# Patient Record
Sex: MALE | Race: WHITE | NOT HISPANIC OR LATINO
[De-identification: names, ages, dates, MRNs, and addresses within clinical notes are randomized per-mention and may not be internally consistent; named-entity substitution may affect disease eponyms.]

---

## 2022-02-03 PROBLEM — Z00.00 ENCOUNTER FOR PREVENTIVE HEALTH EXAMINATION: Status: ACTIVE | Noted: 2022-02-03

## 2022-02-08 ENCOUNTER — TRANSCRIPTION ENCOUNTER (OUTPATIENT)
Age: 47
End: 2022-02-08

## 2022-02-09 ENCOUNTER — OUTPATIENT (OUTPATIENT)
Dept: OUTPATIENT SERVICES | Facility: HOSPITAL | Age: 47
LOS: 1 days | End: 2022-02-09
Payer: COMMERCIAL

## 2022-02-09 ENCOUNTER — RESULT REVIEW (OUTPATIENT)
Age: 47
End: 2022-02-09

## 2022-02-09 DIAGNOSIS — K62.5 HEMORRHAGE OF ANUS AND RECTUM: ICD-10-CM

## 2022-02-09 PROCEDURE — 88305 TISSUE EXAM BY PATHOLOGIST: CPT

## 2022-02-09 PROCEDURE — 45385 COLONOSCOPY W/LESION REMOVAL: CPT

## 2022-02-09 PROCEDURE — 88305 TISSUE EXAM BY PATHOLOGIST: CPT | Mod: 26

## 2022-02-09 PROCEDURE — C1889: CPT

## 2022-02-09 PROCEDURE — 45380 COLONOSCOPY AND BIOPSY: CPT | Mod: XS

## 2022-02-09 DEVICE — CLIP RESOLUTION 360 235CM: Type: IMPLANTABLE DEVICE | Status: FUNCTIONAL

## 2022-02-09 DEVICE — HEMOSPRAY HEMOSTAT ENDO 7F: Type: IMPLANTABLE DEVICE | Status: FUNCTIONAL

## 2022-02-10 LAB — SURGICAL PATHOLOGY STUDY: SIGNIFICANT CHANGE UP

## 2022-07-14 ENCOUNTER — APPOINTMENT (OUTPATIENT)
Dept: ORTHOPEDIC SURGERY | Facility: CLINIC | Age: 47
End: 2022-07-14

## 2022-07-14 VITALS — WEIGHT: 200 LBS | BODY MASS INDEX: 31.39 KG/M2 | HEIGHT: 67 IN

## 2022-07-14 DIAGNOSIS — Z72.89 OTHER PROBLEMS RELATED TO LIFESTYLE: ICD-10-CM

## 2022-07-14 DIAGNOSIS — Z78.9 OTHER SPECIFIED HEALTH STATUS: ICD-10-CM

## 2022-07-14 PROCEDURE — 73140 X-RAY EXAM OF FINGER(S): CPT | Mod: RT

## 2022-07-14 PROCEDURE — 99072 ADDL SUPL MATRL&STAF TM PHE: CPT

## 2022-07-14 PROCEDURE — 99203 OFFICE O/P NEW LOW 30 MIN: CPT

## 2022-07-14 NOTE — HISTORY OF PRESENT ILLNESS
[Work related] : work related [Sudden] : sudden [7] : 7 [Dull/Aching] : dull/aching [Sharp] : sharp [Constant] : constant [Household chores] : household chores [Leisure] : leisure [Work] : work [Nothing helps with pain getting better] : Nothing helps with pain getting better [Full time] : Work status: full time [de-identified] : 46 Year old male presents with RIGHt hand and middle finger pain. Was mild in nature for the past few weeks but then got worse a few days ago after a long day of manual work. Pain with ROM of the middle finger. Denies clicking and locking.  [] : no [FreeTextEntry3] : 7/11/2022 [FreeTextEntry5] : USING POWER TOOLS AND FELT SORENESS THROUGHOUT THE DAY  [de-identified] : NONE  [de-identified] : ANUP

## 2022-07-14 NOTE — PHYSICAL EXAM
[3rd] : 3rd [MCP Joint] : MCP joint [Finger Flexion] : finger flexion [Right] : right fingers [FreeTextEntry9] : RIGHT middle MP degenerative changes

## 2022-07-14 NOTE — DISCUSSION/SUMMARY
[de-identified] : Discussed that his arthritis is not new at this time, but that his pain is likely and exacerbation/flare up due to his heavy use.  He [id number] like to start with NSAIds and warm compresses

## 2022-07-14 NOTE — WORK
[Sprain/Strain] : sprain/strain [Other: ___] : [unfilled] [Was the competent medical cause of the injury] : was the competent medical cause of the injury [Are consistent with the injury] : are consistent with the injury [Consistent with my objective findings] : consistent with my objective findings [Partial] : partial [Reveals pre-existing condition(s) that may affect treatment/prognosis] : reveals pre-existing condition(s) that may affect treatment/prognosis [I provided the services listed above] :  I provided the services listed above. [FreeTextEntry2] : Arthritis in MP joint

## 2022-08-04 ENCOUNTER — APPOINTMENT (OUTPATIENT)
Dept: ORTHOPEDIC SURGERY | Facility: CLINIC | Age: 47
End: 2022-08-04

## 2022-08-04 VITALS — BODY MASS INDEX: 31.39 KG/M2 | WEIGHT: 200 LBS | HEIGHT: 67 IN

## 2022-08-04 PROCEDURE — 20605 DRAIN/INJ JOINT/BURSA W/O US: CPT

## 2022-08-04 PROCEDURE — 99214 OFFICE O/P EST MOD 30 MIN: CPT | Mod: 25

## 2022-08-04 PROCEDURE — 99072 ADDL SUPL MATRL&STAF TM PHE: CPT

## 2022-08-04 NOTE — HISTORY OF PRESENT ILLNESS
[Work related] : work related [8] : 8 [0] : 0 [Full time] : Work status: full time [de-identified] : 46 year old male followed for RIGHT middle MP arthritis and sprain.  Has been using NSAIDs and warm compresses.  States there may be slight relief.  \par Occup: [employer] [] : no [FreeTextEntry1] : right hand and middle finger  [de-identified] : no treatment at this time

## 2022-08-04 NOTE — PROCEDURE
[Small Joint Injection] : small joint injection [Right] : of the right [3rd] : 3rd [Metacarpalphalangeal joint] : metacarpalphalangeal joint [___ cc    1%] : Lidocaine ~Vcc of 1%  [___ cc    10mg] : Triamcinolone (Kenalog) ~Vcc of 10 mg

## 2022-08-04 NOTE — DISCUSSION/SUMMARY
[Medication Risks Reviewed] : Medication risks reviewed [de-identified] : Discussed injection as well as risks of NSAIDs

## 2022-08-04 NOTE — WORK
[Partial] : partial [Can return to work without limitations on ______] : can return to work without limitations on [unfilled] [I provided the services listed above] :  I provided the services listed above.

## 2022-08-04 NOTE — REVIEW OF SYSTEMS
[Joint Pain] : joint pain [Joint Stiffness] : joint stiffness [Joint Swelling] : joint swelling [Negative] : Heme/Lymph [FreeTextEntry9] : right hand

## 2022-09-08 ENCOUNTER — APPOINTMENT (OUTPATIENT)
Dept: ORTHOPEDIC SURGERY | Facility: CLINIC | Age: 47
End: 2022-09-08

## 2022-10-13 ENCOUNTER — APPOINTMENT (OUTPATIENT)
Dept: ORTHOPEDIC SURGERY | Facility: CLINIC | Age: 47
End: 2022-10-13

## 2022-10-13 VITALS — BODY MASS INDEX: 31.39 KG/M2 | HEIGHT: 67 IN | WEIGHT: 200 LBS

## 2022-10-13 DIAGNOSIS — M79.644 PAIN IN RIGHT FINGER(S): ICD-10-CM

## 2022-10-13 DIAGNOSIS — M19.041 PRIMARY OSTEOARTHRITIS, RIGHT HAND: ICD-10-CM

## 2022-10-13 PROCEDURE — 20605 DRAIN/INJ JOINT/BURSA W/O US: CPT

## 2022-10-13 PROCEDURE — 99072 ADDL SUPL MATRL&STAF TM PHE: CPT

## 2022-10-13 PROCEDURE — 99213 OFFICE O/P EST LOW 20 MIN: CPT | Mod: 25

## 2022-10-13 NOTE — PROCEDURE
[Small Joint Injection] : small joint injection [3rd] : 3rd [Metacarpalphalangeal joint] : metacarpalphalangeal joint [Pain] : pain [Inflammation] : inflammation [Alcohol] : alcohol [Ethyl Chloride sprayed topically] : ethyl chloride sprayed topically [Sterile technique used] : sterile technique used [___ cc    1%] : Lidocaine ~Vcc of 1%  [___ cc    10mg] : Triamcinolone (Kenalog) ~Vcc of 10 mg  [Risks, benefits, alternatives discussed / Verbal consent obtained] : the risks benefits, and alternatives have been discussed, and verbal consent was obtained

## 2022-10-13 NOTE — HISTORY OF PRESENT ILLNESS
[7] : 7 [0] : 0 [Full time] : Work status: full time [] : yes [de-identified] : 46 year old male followed for RIGHT middle MP arthritis and sprain. 2.5 months s/p CSI with good relief. Recent reoccurance. \par Occup:  \par \par  [de-identified] : no treatment at this time

## 2022-10-26 ENCOUNTER — APPOINTMENT (OUTPATIENT)
Dept: ORTHOPEDIC SURGERY | Facility: CLINIC | Age: 47
End: 2022-10-26

## 2022-11-01 ENCOUNTER — APPOINTMENT (OUTPATIENT)
Dept: ORTHOPEDIC SURGERY | Facility: CLINIC | Age: 47
End: 2022-11-01

## 2022-11-01 VITALS — WEIGHT: 200 LBS | BODY MASS INDEX: 31.39 KG/M2 | HEIGHT: 67 IN

## 2022-11-01 PROCEDURE — 20611 DRAIN/INJ JOINT/BURSA W/US: CPT

## 2022-11-01 PROCEDURE — 73030 X-RAY EXAM OF SHOULDER: CPT | Mod: RT

## 2022-11-01 PROCEDURE — 99214 OFFICE O/P EST MOD 30 MIN: CPT | Mod: 25

## 2022-11-01 NOTE — HISTORY OF PRESENT ILLNESS
[de-identified] : The patient is a 46 year old right  hand dominant male who presents today complaining of right shoulder pain.  \par Date of Injury/Onset: n/a\par Pain:    At Rest: 0/10 \par With Activity:  7-8/10 \par Mechanism of injury: Patient states he woke up with right shoulder pain a couple of weeks ago.\par This is not a Work Related Injury being treated under Worker's Compensation.\par This is not an athletic injury occurring associated with an interscholastic or organized sports team.\par Quality of symptoms: sharp\par Improves with: rest\par Worse with: certain movements \par Prior treatment: none\par Prior Imaging: none\par Out of work/sport: _, since _\par School/Sport/Position/Occupation: working, fulltime\par Additional Information: None\par

## 2022-11-01 NOTE — DISCUSSION/SUMMARY
[de-identified] : Follow up after MRI of right shoulder to eval rotator cuff tear\par \par Ultrasound Guided Cortisone Shoulder Injection - Right\par The risks, benefits, and alternatives to cortisone injection were explained in full to the patient.  Risks outlined include but are not limited to infection, sepsis, bleeding, scarring, skin discoloration, temporary increase in pain, syncopal episode, failure to resolve symptoms, allergic reaction, symptom recurrence, and elevation of blood sugar in diabetics.  Patient understood the risks.  All questions were answered.  After discussion of options, patient requested an injection.  Oral informed consent was obtained and sterile prep was done of the injection site.  A mixture of 40mg of Kenalog, 3cc of 1% Lidocaine was sterilely prepared by me.  Sterile technique was used to introduce the mixture into the right shoulder. Ultrasound was used for proper needle placement.  Patient tolerated the procedure well.  Advised to ice the injection site this evening. \par \par -----------------------------------------------\par Home Exercise\par The patient is instructed on a home exercise program.\par \par RIN GARCIA Acting as a Scribe for Dr. Palma\par I, Rin Garcia, attest that this documentation has been prepared under the direction and in the presence of Provider Wood Palma MD.\par \par Activity Modification\par The patient was advised to modify their activities.\par \par Dx / Natural History\par The patient was advised of the diagnosis.  The natural history of the pathology was explained in full to the patient in layman's terms.  Several different treatment options were discussed and explained in full to the patient including the risks and benefits of both surgical and non-surgical treatments.  All questions and concerns were answered.\par \par Pain Guide Activities\par The patient was advised to let pain guide the gradual advancement of activities.\par \par RICE\par I explained to the patient that rest, ice, compression, and elevation would benefit them.  They may return to activity after follow-up or when they no longer have any pain.

## 2022-11-01 NOTE — PHYSICAL EXAM
[de-identified] : Neurologic: normal coordination, normal DTR UE/LE , normal sensation, normal mood and affect, orientated and able to communicate. \par Skin: normal skin, no rash, no ulcers and no lesions. \par Lymphatic: no obvious lymphadenopathy in areas examined. \par Constitutional: well developed and well nourished. \par Cardiovascular: peripheral vascular exam is grossly normal. \par Pulmonary: no respiratory distress, lungs clear to auscultation bilaterally. \par Abdomen: normal bowel sounds, non-tender, no HSM and no mass. \par \par Right Shoulder:\par Positive Neer test\par Positive Gerber test \par Positive Jackie sign\par Positive Impingement test\par Supraspinatus strength: 4-/5\par +Drop Arm\par No biceps tenderness\par No AC tenderness\par Right Shoulder X-Ray: 3 views: Unremarkable

## 2022-11-02 ENCOUNTER — FORM ENCOUNTER (OUTPATIENT)
Age: 47
End: 2022-11-02

## 2022-11-03 ENCOUNTER — APPOINTMENT (OUTPATIENT)
Dept: MRI IMAGING | Facility: CLINIC | Age: 47
End: 2022-11-03

## 2022-11-03 PROCEDURE — 73221 MRI JOINT UPR EXTREM W/O DYE: CPT | Mod: RT

## 2022-11-15 ENCOUNTER — APPOINTMENT (OUTPATIENT)
Dept: ORTHOPEDIC SURGERY | Facility: CLINIC | Age: 47
End: 2022-11-15

## 2022-11-15 VITALS — WEIGHT: 200 LBS | HEIGHT: 67 IN | BODY MASS INDEX: 31.39 KG/M2

## 2022-11-15 PROCEDURE — 99214 OFFICE O/P EST MOD 30 MIN: CPT

## 2022-11-15 NOTE — DISCUSSION/SUMMARY
[de-identified] : Pain resolved with CSI at last visit.\par \par Reviewed all images with patient. \par \par Continue home exercise\par \par Follow up as needed \par -----------------------------------------------\par Home Exercise\par The patient is instructed on a home exercise program.\par \par RIN GARCIA Acting as a Scribe for Dr. Palma\par I, Rin Garcia, attest that this documentation has been prepared under the direction and in the presence of Provider Wood Palma MD.\par \par Activity Modification\par The patient was advised to modify their activities.\par \par Dx / Natural History\par The patient was advised of the diagnosis.  The natural history of the pathology was explained in full to the patient in layman's terms.  Several different treatment options were discussed and explained in full to the patient including the risks and benefits of both surgical and non-surgical treatments.  All questions and concerns were answered.\par \par Pain Guide Activities\par The patient was advised to let pain guide the gradual advancement of activities.\par \par RICE\par I explained to the patient that rest, ice, compression, and elevation would benefit them.  They may return to activity after follow-up or when they no longer have any pain.

## 2022-11-15 NOTE — HISTORY OF PRESENT ILLNESS
[de-identified] : The patient is a 46 year old right hand dominant male who presents today complaining of right shoulder pain. \par Date of Injury/Onset: n/a\par Pain: At Rest: 0/10 \par With Activity: 5/10 \par Mechanism of injury: Patient states he woke up with right shoulder pain a couple of weeks ago.\par This is not a Work Related Injury being treated under Worker's Compensation.\par This is not an athletic injury occurring associated with an interscholastic or organized sports team.\par Quality of symptoms: sharp\par Improves with: rest\par Worse with: certain movements \par Prior treatment: MRI [facility]\par Prior Imaging: none\par Out of work/sport: _, since _\par School/Sport/Position/Occupation: working, fulltime\par Additional Information: Patient states CSI injection helped , also doing at home exercises

## 2022-11-15 NOTE — PHYSICAL EXAM
[de-identified] : Neurologic: normal coordination, normal DTR UE/LE , normal sensation, normal mood and affect, orientated and able to communicate. \par Skin: normal skin, no rash, no ulcers and no lesions. \par Lymphatic: no obvious lymphadenopathy in areas examined. \par Constitutional: well developed and well nourished. \par Cardiovascular: peripheral vascular exam is grossly normal. \par Pulmonary: no respiratory distress, lungs clear to auscultation bilaterally. \par Abdomen: normal bowel sounds, non-tender, no HSM and no mass. \par \par Right Shoulder:\par Positive Neer test\par Positive Gerber test \par Positive Jackie sign\par Positive Impingement test\par Supraspinatus strength: 4-/5\par +Drop Arm\par No biceps tenderness\par No AC tenderness\par Right Shoulder X-Ray: 3 views: Unremarkable

## 2022-11-15 NOTE — ASSESSMENT
[FreeTextEntry1] : 11/3/22 MRI RT SHOULDER [facility]\par Impression: \par 1. Rotator cuff tendinopathy, mild subacromial bursitis, glenohumeral joint arthrosis, and mild glenohumeral \par joint capsulitis with mild biceps tenosynovitis. \par 2. No rotator cuff tear, acute osseous injury, or muscle atrophy.\par 3. AC joint arthrosis and lateral acromial bone spurs.\par 4. Multiple new findings compared to prior exam.

## 2022-12-05 ENCOUNTER — APPOINTMENT (OUTPATIENT)
Dept: ORTHOPEDIC SURGERY | Facility: CLINIC | Age: 47
End: 2022-12-05

## 2023-02-10 ENCOUNTER — APPOINTMENT (OUTPATIENT)
Dept: ORTHOPEDIC SURGERY | Facility: CLINIC | Age: 48
End: 2023-02-10
Payer: COMMERCIAL

## 2023-02-10 VITALS — WEIGHT: 200 LBS | HEIGHT: 67 IN | BODY MASS INDEX: 31.39 KG/M2

## 2023-02-10 DIAGNOSIS — E78.00 PURE HYPERCHOLESTEROLEMIA, UNSPECIFIED: ICD-10-CM

## 2023-02-10 PROCEDURE — 72040 X-RAY EXAM NECK SPINE 2-3 VW: CPT

## 2023-02-10 PROCEDURE — 99214 OFFICE O/P EST MOD 30 MIN: CPT

## 2023-02-10 RX ORDER — METHYLPREDNISOLONE 4 MG/1
4 TABLET ORAL
Qty: 1 | Refills: 0 | Status: ACTIVE | COMMUNITY
Start: 2023-02-10 | End: 1900-01-01

## 2023-02-14 ENCOUNTER — FORM ENCOUNTER (OUTPATIENT)
Age: 48
End: 2023-02-14

## 2023-02-14 NOTE — ASSESSMENT
[FreeTextEntry1] : This is a 47 year old male with cervical ddd seen on x-ray today at [facility].  This patient will undergo PT directed at his cervical spine.  This patient has underwent multiple types of conservative tx including NSAIDs, HEP, acupuncture/chiropractic care with minimal change in symptoms.  He will obtain MRI of his cervical spine to assess for HNP.  Patient will f/u for MRI review.  \par \par Explained intended effects, potential side effects, and schedule of dosages of the medication.  Discussed red flag signs and symptoms of worsening condition, when to call the office, and when to seek higher level of care.\par \par \par Prior to appointment and during encounter with patient extensive medical records were reviewed including but not limited to, hospital records, outpatient records, imaging results, and lab data. During this appointment the patient was examined, diagnoses were discussed and explained in a face to face manner. In addition extensive time was spent reviewing aforementioned diagnostic studies. Counseling including abnormal image results, differential diagnoses, treatment options, risk and benefits, lifestyle changes, current condition, and current medications was performed. Patient's comments, questions, and concerns were addressed and patient verbalized understanding. Based on this patient's presentation at our office, which is an orthopedic spine surgeon's office, this patient inherently / intrinsically has a risk, however minute, of developing issues such as Cauda equina syndrome, bowel and bladder changes, or progression of motor or neurological deficits such as paralysis which may be permanent.\par \par Fuad WHITFIELD, personally performed the services described in this documentation incident to Edgardo Galvan MD. \par

## 2023-02-14 NOTE — PHYSICAL EXAM
[Disc space narrowing] : Disc space narrowing [Normal Coordination] : normal coordination [Normal DTR UE/LE] : normal DTR UE/LE  [Normal Sensation] : normal sensation [Normal Mood and Affect] : normal mood and affect [Orientated] : orientated [Able to Communicate] : able to communicate [Normal Skin] : normal skin [No Rash] : no rash [No Ulcers] : no ulcers [No Lesions] : no lesions [No obvious lymphadenopathy in areas examined] : no obvious lymphadenopathy in areas examined [Well Developed] : well developed [Well Nourished] : well nourished [Peripheral vascular exam is grossly normal] : peripheral vascular exam is grossly normal [No Respiratory Distress] : no respiratory distress [de-identified] : Cervical Spine Exam: \par    \par                                                 \par Constitutional:  \par - No acute distress  \par - Well developed; well nourished  \par   \par Neurological:  \par - normal mood and affect  \par - alert and oriented x 3   \par   \par Cardiovascular:  \par - grossly normal\par \par Inspection: \par   \par erythema (-)  \par ecchymosis (-)  \par rashes (-)  \par   \par Palpation:   \par Cervical paraspinal tenderness:     	R (-); L(-) \par Upper trapezius tenderness:          	R (-); L (-) \par Rhomboids tenderness:                  	R (-); L (-) \par Occipital Ridge:                                	R (-); L (-) \par Supraspinatus tenderness:             	R (-); L (-) \par   \par ROM:   \par ROM - full range of motion with mild stiffness \par Pain with  extension>flexion\par   \par Strength Testing:        	Right	Left \par Deltoid                             (5/5)    (5/5) \par Biceps:                            (5/5)    (5/5) \par Triceps:                           (5/5)    (5/5) \par Finger Abductors:           (5/5)    (5/5) \par Grasp:                             (5/5)    (5/5) \par   \par Special Testing: \par Spurling Test:              	R (Eq); L (Eq) \par Facet load test:           	R (+); L (+) \par Hoffmans:                               R (-); L(-)\par   \par Neuro: \par SILT throughout right upper extremity \par SILT throughout left upper extremity \par   \par Reflexes: \par Biceps   -       	R (2+); L (2+) \par Triceps  -       	R (2+); L (2+) \par Brachioradialis- R (2+); L (2+) \par   \par No ankle clonus\par

## 2023-02-14 NOTE — HISTORY OF PRESENT ILLNESS
[Neck] : neck [Gradual] : gradual [8] : 8 [6] : 6 [Dull/Aching] : dull/aching [Constant] : constant [Rest] : rest [Full time] : Work status: full time [de-identified] : 02/10/2023 - Patient presents to the office for evaluation of left sided neck pain with radiation to his left trapezius which started about 6 weeks ago which is similar to past problems.  Patient reports that this issue does interfere with daily activities and can be described as dull /throbbing/ache.  There are no associated symptoms including weakness/arm pain.  Patient reports neck extension exacerbate symptoms and not many treatments have improved symptoms.  He states that their symptoms have not improved since initial onset despite tx.  Patient is taking OTC NSAIDs and participating in chiropractic/acupuncture. 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

## 2023-02-15 ENCOUNTER — APPOINTMENT (OUTPATIENT)
Dept: MRI IMAGING | Facility: CLINIC | Age: 48
End: 2023-02-15
Payer: COMMERCIAL

## 2023-02-15 PROCEDURE — 72141 MRI NECK SPINE W/O DYE: CPT

## 2023-02-17 ENCOUNTER — APPOINTMENT (OUTPATIENT)
Dept: ORTHOPEDIC SURGERY | Facility: CLINIC | Age: 48
End: 2023-02-17
Payer: COMMERCIAL

## 2023-02-17 VITALS — BODY MASS INDEX: 31.39 KG/M2 | HEIGHT: 67 IN | WEIGHT: 200 LBS

## 2023-02-17 DIAGNOSIS — Z78.9 OTHER SPECIFIED HEALTH STATUS: ICD-10-CM

## 2023-02-17 PROCEDURE — 99214 OFFICE O/P EST MOD 30 MIN: CPT

## 2023-02-25 NOTE — PHYSICAL EXAM
[Normal Coordination] : normal coordination [Normal DTR UE/LE] : normal DTR UE/LE  [Normal Sensation] : normal sensation [Normal Mood and Affect] : normal mood and affect [Orientated] : orientated [Able to Communicate] : able to communicate [Normal Skin] : normal skin [No Rash] : no rash [No Ulcers] : no ulcers [No Lesions] : no lesions [No obvious lymphadenopathy in areas examined] : no obvious lymphadenopathy in areas examined [Well Developed] : well developed [Well Nourished] : well nourished [Peripheral vascular exam is grossly normal] : peripheral vascular exam is grossly normal [No Respiratory Distress] : no respiratory distress [Disc space narrowing] : Disc space narrowing [de-identified] : Cervical Spine Exam: \par    \par                                                 \par Constitutional:  \par - No acute distress  \par - Well developed; well nourished  \par   \par Neurological:  \par - normal mood and affect  \par - alert and oriented x 3   \par   \par Cardiovascular:  \par - grossly normal\par \par Inspection: \par   \par erythema (-)  \par ecchymosis (-)  \par rashes (-)  \par   \par Palpation:   \par Cervical paraspinal tenderness:     	R (-); L(-) \par Upper trapezius tenderness:          	R (-); L (-) \par Rhomboids tenderness:                  	R (-); L (-) \par Occipital Ridge:                                	R (-); L (-) \par Supraspinatus tenderness:             	R (-); L (-) \par   \par ROM:   \par ROM - full range of motion with mild stiffness \par Pain with  extension>flexion\par   \par Strength Testing:        	Right	Left \par Deltoid                             (5/5)    (5/5) \par Biceps:                            (5/5)    (5/5) \par Triceps:                           (5/5)    (5/5) \par Finger Abductors:           (5/5)    (5/5) \par Grasp:                             (5/5)    (5/5) \par   \par Special Testing: \par Spurling Test:              	R (Eq); L (Eq) \par Facet load test:           	R (+); L (+) \par Hoffmans:                               R (-); L(-)\par   \par Neuro: \par SILT throughout right upper extremity \par SILT throughout left upper extremity \par   \par Reflexes: \par Biceps   -       	R (2+); L (2+) \par Triceps  -       	R (2+); L (2+) \par Brachioradialis- R (2+); L (2+) \par   \par No ankle clonus\par

## 2023-02-25 NOTE — HISTORY OF PRESENT ILLNESS
[Neck] : neck [Gradual] : gradual [8] : 8 [6] : 6 [Dull/Aching] : dull/aching [Constant] : constant [Rest] : rest [Full time] : Work status: full time [de-identified] : 02/17/2023 - 48 y/o M presenting for a test follow up of C spine. He continues to complain of left sided neck pain with radiation to his left trapezius. No pain extending past the trapezius. MDP and methocarbamol provided relief. \par \par 02/10/2023 - Patient presents to the office for evaluation of left sided neck pain with radiation to his left trapezius which started about 6 weeks ago which is similar to past problems.  Patient reports that this issue does interfere with daily activities and can be described as dull /throbbing/ache.  There are no associated symptoms including weakness/arm pain.  Patient reports neck extension exacerbate symptoms and not many treatments have improved symptoms.  He states that their symptoms have not improved since initial onset despite tx.  Patient is taking OTC NSAIDs and participating in chiropractic/acupuncture. 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

## 2023-02-25 NOTE — ASSESSMENT
[FreeTextEntry1] : This is a 47 year old male with cervical ddd, multi level left foraminal disc herniations with nerve impingement. Discussed continued medical mgmt and exercise based rehabilitation. Intermittent interventional spine injections if increased radicular pains. I advised the patient to continue his current PT trial, and to begin implementing the exercises they're teaching him into a daily routine. Discussed medical mgmt, continue Methocarbamol as prescribed, jusicious use nsaids .No surgical indications. F/U in 4 weeks. \par \par Prior to appointment and during encounter with patient extensive medical records were reviewed including but not limited to, hospital records, outpatient records, imaging results, and lab data. During this appointment the patient was examined, diagnoses were discussed and explained in a face to face manner. In addition extensive time was spent reviewing aforementioned diagnostic studies. Counseling including abnormal image results, differential diagnoses, treatment options, risk and benefits, lifestyle changes, current condition, and current medications was performed. Patient's comments, questions, and concerns were addressed and patient verbalized understanding. Based on this patient's presentation at our office, which is an orthopedic spine surgeon's office, this patient inherently / intrinsically has a risk, however minute, of developing issues such as Cauda equina syndrome, bowel and bladder changes, or progression of motor or neurological deficits such as paralysis which may be permanent.\par \par MEHNAZ MAGANA Acting as a Scribe for Dr. Ross\micah I, Mehnaz Magana, attest that this documentation has been prepared under the direction and in the presence of Provider Edgardo Ross MD. \par

## 2023-03-11 ENCOUNTER — APPOINTMENT (OUTPATIENT)
Dept: ORTHOPEDIC SURGERY | Facility: CLINIC | Age: 48
End: 2023-03-11
Payer: COMMERCIAL

## 2023-03-11 DIAGNOSIS — M48.061 SPINAL STENOSIS, LUMBAR REGION WITHOUT NEUROGENIC CLAUDICATION: ICD-10-CM

## 2023-03-11 PROCEDURE — 99214 OFFICE O/P EST MOD 30 MIN: CPT

## 2023-03-12 NOTE — HISTORY OF PRESENT ILLNESS
[de-identified] : 3/1/2023 - Patient presenting for a follow up. Progressive symptoms since his last visit. C/o arm pain and paresthesia extending throughout the left upper extremity. Difficulty lifting left arm due to subjective weakness. Symptoms tolerable in the morning, severity of pain gradually increases throughout the day. Relief from PT and muscle relaxer. He reports some improvement ROM cervical transient from PT. Treating with Advil. Symptomatic since late December. \par \par 02/17/2023 - 48 y/o M presenting for a test follow up of C spine. He continues to complain of left sided neck pain with radiation to his left trapezius. No pain extending past the trapezius. MDP and methocarbamol provided relief. \par \par 02/10/2023 - Patient presents to the office for evaluation of left sided neck pain with radiation to his left trapezius which started about 6 weeks ago which is similar to past problems.  Patient reports that this issue does interfere with daily activities and can be described as dull /throbbing/ache.  There are no associated symptoms including weakness/arm pain.  Patient reports neck extension exacerbate symptoms and not many treatments have improved symptoms.  He states that their symptoms have not improved since initial onset despite tx.  Patient is taking OTC NSAIDs and participating in chiropractic/acupuncture. Patient denies fevers, acute weakness, unexpected weight loss, urinary incontinence, and bowel incontinence.\par \par Subjective Weakness: No \par \par Numbness/Tingling: no\par Bladder/Bowel dysfunction: no\par Gait Abnormalities: no\par Fine motor coordination changes:  no\par \par  \par Injections: Yes - he believe he had CARMELO is the past   \par \par Pertinent Surgical History: N/A \par \par

## 2023-03-12 NOTE — DATA REVIEWED
[MRI] : MRI [Cervical Spine] : cervical spine [I independently reviewed and interpreted images and report] : I independently reviewed and interpreted images and report [I reviewed the films/CD and additionally noted] : I reviewed the films/CD and additionally noted [FreeTextEntry1] : I stop paperwork reviewed

## 2023-03-12 NOTE — PHYSICAL EXAM
[Normal Coordination] : normal coordination [Normal DTR UE/LE] : normal DTR UE/LE  [Normal Sensation] : normal sensation [Normal Mood and Affect] : normal mood and affect [Orientated] : orientated [Able to Communicate] : able to communicate [Normal Skin] : normal skin [No Rash] : no rash [No Ulcers] : no ulcers [No Lesions] : no lesions [No obvious lymphadenopathy in areas examined] : no obvious lymphadenopathy in areas examined [Well Developed] : well developed [Well Nourished] : well nourished [Peripheral vascular exam is grossly normal] : peripheral vascular exam is grossly normal [No Respiratory Distress] : no respiratory distress [Disc space narrowing] : Disc space narrowing [de-identified] : Cervical Spine Exam: \par    \par                                                 \par Constitutional:  \par - No acute distress  \par - Well developed; well nourished  \par   \par Neurological:  \par - normal mood and affect  \par - alert and oriented x 3   \par   \par Cardiovascular:  \par - grossly normal\par \par Inspection: \par   \par erythema (-)  \par ecchymosis (-)  \par rashes (-)  \par   \par Palpation:   \par Cervical paraspinal tenderness:     	R (-); L(-) \par Upper trapezius tenderness:          	R (-); L (-) \par Rhomboids tenderness:                  	R (-); L (-) \par Occipital Ridge:                                	R (-); L (-) \par Supraspinatus tenderness:             	R (-); L (-) \par   \par ROM:   \par ROM - full range of motion with mild stiffness \par Pain with  extension>flexion\par   \par Strength Testing:        	Right	Left \par Deltoid                             (5/5)    (5/5) \par Biceps:                            (5/5)    (5/5) \par Triceps:                           (5/5)    (5/5) \par Finger Abductors:           (5/5)    (5/5) \par Grasp:                             (5/5)    (5/5) \par   \par Special Testing: \par Spurling Test:              	R (Eq); L (Eq) \par Facet load test:           	R (+); L (+) \par Hoffmans:                               R (-); L(-)\par   \par Neuro: \par SILT throughout right upper extremity \par SILT throughout left upper extremity \par   \par Reflexes: \par Biceps   -       	R (2+); L (2+) \par Triceps  -       	R (2+); L (2+) \par Brachioradialis- R (2+); L (2+) \par   \par No ankle clonus\par

## 2023-03-12 NOTE — ASSESSMENT
[FreeTextEntry1] : This is a 47 year old male with cervical ddd, multi level left foraminal disc herniations with nerve impingement. Discussed continued medical mgmt and exercise based rehabilitation. Intermittent interventional spine injections if increased radicular pains. I advised the patient to continue his current PT trial, and to begin implementing the exercises they're teaching him into a daily routine. Discussed medical mgmt, continue Methocarbamol as prescribed, jusicious use nsaids .Discussed ACDF C5-6 if refractory to non operative mgmt.. Patient remains temporarily and totally disabled from customary work at this time due to symptom severity. Patient will stay OOW. Note given. Follow up in 4 weeks. \par \par \par Prior to appointment and during encounter with patient extensive medical records were reviewed including but not limited to, hospital records, outpatient records, imaging results, and lab data. During this appointment the patient was examined, diagnoses were discussed and explained in a face to face manner. In addition extensive time was spent reviewing aforementioned diagnostic studies. Counseling including abnormal image results, differential diagnoses, treatment options, risk and benefits, lifestyle changes, current condition, and current medications was performed. Patient's comments, questions, and concerns were addressed and patient verbalized understanding. Based on this patient's presentation at our office, which is an orthopedic spine surgeon's office, this patient inherently / intrinsically has a risk, however minute, of developing issues such as Cauda equina syndrome, bowel and bladder changes, or progression of motor or neurological deficits such as paralysis which may be permanent.\par \par MEHNAZ MAGANA Acting as a Scribe for Dr. Ross\par I, Mehnaz Magana, attest that this documentation has been prepared under the direction and in the presence of Provider Edgardo Ross MD. \par

## 2023-03-17 ENCOUNTER — APPOINTMENT (OUTPATIENT)
Dept: ORTHOPEDIC SURGERY | Facility: CLINIC | Age: 48
End: 2023-03-17

## 2023-03-23 ENCOUNTER — APPOINTMENT (OUTPATIENT)
Dept: PAIN MANAGEMENT | Facility: CLINIC | Age: 48
End: 2023-03-23
Payer: COMMERCIAL

## 2023-03-23 VITALS — HEIGHT: 67 IN | BODY MASS INDEX: 31.39 KG/M2 | WEIGHT: 200 LBS

## 2023-03-23 DIAGNOSIS — M50.30 OTHER CERVICAL DISC DEGENERATION, UNSPECIFIED CERVICAL REGION: ICD-10-CM

## 2023-03-23 PROCEDURE — 99204 OFFICE O/P NEW MOD 45 MIN: CPT

## 2023-03-23 NOTE — PHYSICAL EXAM
[de-identified] : Constitutional; Appears well, no apparent distress\par Ability to communicate: Normal \par Respiratory: non-labored breathing\par Skin: No rash noted\par Head: Normocephalic, atraumatic\par Neck: no visible thyroid enlargement\par Eyes: Extraocular movements intact\par Neurologic: Alert and oriented x3\par Psychiatric: normal mood, affect and behavior  [] : negative Spurling

## 2023-03-23 NOTE — HISTORY OF PRESENT ILLNESS
[Neck] : neck [10] : 10 [Dull/Aching] : dull/aching [Constant] : constant [Household chores] : household chores [Sleep] : sleep [Meds] : meds [Physical therapy] : physical therapy [Nothing helps with pain getting better] : Nothing helps with pain getting better [Standing] : standing [Walking] : walking [Lying in bed] : lying in bed [FreeTextEntry1] : Initial HPI 03/23/23:\par Pain started Dec 2022 and is on the left side of the neck and radiates to the left shoulder and down the left arm to the fingers described as a numbness and tingling. Saw Dr. Ross who recommended CARMELO and possible C5-6 ACDF. \par \par MRI Cervical Spine 2/15/23 independently reviewed: multilevel HNP with left C4,5,6,8 impingement \par Conservative Care: Has been doing PT which is helping\par Pain Medications: Methocarbamol and advil PRN \par Past Injections: none\par Spine surgery: none \par Blood thinners: none\par  [] : This patient has had an injection before: no [FreeTextEntry7] : left arm and fingers  [FreeTextEntry9] : muscle relaxer  [de-identified] : c mri

## 2023-03-23 NOTE — DISCUSSION/SUMMARY
[de-identified] : After discussing various treatment options with the patient including but not limited to oral medications, physical therapy, exercise modalities as well as interventional spinal injections, we have decided with the following plan:\par \par - Continue Home exercises, stretching, activity modification, physical therapy, and conservative care.\par - MRI report and/or images was reviewed and discussed with the patient.\par - Recommend C7-T1 Cervical Epidural Steroid Injection under fluoroscopic guidance with image.\par - The risks, benefits and alternatives of the proposed procedure were explained in detail with the patient. The risks outlined include but are not limited to infection, bleeding, post-dural puncture headache, nerve injury, a temporary increase in pain, failure to resolve symptoms, allergic reaction, symptom recurrence, and possible elevation of blood sugar in diabetics. All questions were answered to patient's apparent satisfaction and he/she verbalized an understanding.\par - Patient is presenting with acute/sub-acute radicular pain with impairment in ADLs and functionality.  The pain has not responded to conservative care including NSAID therapy and/or physical therapy.  There is no bleeding tendency, unstable medical condition, or systemic infection.\par - Follow up in 1-2 weeks post injection for re-evaluation.\par

## 2023-03-27 RX ORDER — METHOCARBAMOL 750 MG/1
750 TABLET, FILM COATED ORAL
Qty: 30 | Refills: 1 | Status: ACTIVE | COMMUNITY
Start: 2023-02-10 | End: 1900-01-01

## 2023-03-28 ENCOUNTER — TRANSCRIPTION ENCOUNTER (OUTPATIENT)
Age: 48
End: 2023-03-28

## 2023-04-03 ENCOUNTER — APPOINTMENT (OUTPATIENT)
Dept: ORTHOPEDIC SURGERY | Facility: CLINIC | Age: 48
End: 2023-04-03
Payer: COMMERCIAL

## 2023-04-03 VITALS — WEIGHT: 200 LBS | HEIGHT: 67 IN | BODY MASS INDEX: 31.39 KG/M2

## 2023-04-03 PROCEDURE — 99214 OFFICE O/P EST MOD 30 MIN: CPT

## 2023-04-03 NOTE — DATA REVIEWED
[MRI] : MRI [Cervical Spine] : cervical spine [Report was reviewed and noted in the chart] : The report was reviewed and noted in the chart [I independently reviewed and interpreted images and report] : I independently reviewed and interpreted images and report [I reviewed the films/CD and additionally noted] : I reviewed the films/CD and additionally noted [FreeTextEntry1] : I stop paperwork reviewed\par PT progress notes reviewed\par PAin mgmt progress notes reviewed

## 2023-04-03 NOTE — HISTORY OF PRESENT ILLNESS
[7] : 7 [6] : 6 [] : yes [Not working due to injury] : Work status: not working due to injury [de-identified] : 4/3/23: Patient presenting for a follow up. Scheduled for AL with Dr. Hilton within the next few weeks. Severity of pain ranges day to day. Chief complaint is episodic pain radiating throughout the LT UE. Intermittent subjective weakness in the left upper extremity, but gradually improving with PT. Overall he is improving transient from PT. Continues treatment with muscle relaxer as needed at nighttime. \par \par 3/1/2023 - Patient presenting for a follow up. Progressive symptoms since his last visit. C/o arm pain and paresthesia extending throughout the left upper extremity. Difficulty lifting left arm due to subjective weakness. Symptoms tolerable in the morning, severity of pain gradually increases throughout the day. Relief from PT and muscle relaxer. He reports some improvement ROM cervical transient from PT. Treating with Advil. Symptomatic since late December. \par \par 02/17/2023 - 46 y/o M presenting for a test follow up of C spine. He continues to complain of left sided neck pain with radiation to his left trapezius. No pain extending past the trapezius. MDP and methocarbamol provided relief. \par \par 02/10/2023 - Patient presents to the office for evaluation of left sided neck pain with radiation to his left trapezius which started about 6 weeks ago which is similar to past problems.  Patient reports that this issue does interfere with daily activities and can be described as dull /throbbing/ache.  There are no associated symptoms including weakness/arm pain.  Patient reports neck extension exacerbate symptoms and not many treatments have improved symptoms.  He states that their symptoms have not improved since initial onset despite tx.  Patient is taking OTC NSAIDs and participating in chiropractic/acupuncture. Patient denies fevers, acute weakness, unexpected weight loss, urinary incontinence, and bowel incontinence.\par \par Subjective Weakness: No \par \par Numbness/Tingling: no\par Bladder/Bowel dysfunction: no\par Gait Abnormalities: no\par Fine motor coordination changes:  no\par \par  \par Injections: Yes - he believe he had CARMELO is the past   \par \par Pertinent Surgical History: N/A \par \par  [FreeTextEntry7] : lt arm [de-identified] : pain mgmt, p/t

## 2023-04-03 NOTE — PHYSICAL EXAM
[Normal Coordination] : normal coordination [Normal DTR UE/LE] : normal DTR UE/LE  [Normal Sensation] : normal sensation [Normal Mood and Affect] : normal mood and affect [Orientated] : orientated [Able to Communicate] : able to communicate [Normal Skin] : normal skin [No Rash] : no rash [No Ulcers] : no ulcers [No Lesions] : no lesions [No obvious lymphadenopathy in areas examined] : no obvious lymphadenopathy in areas examined [Well Developed] : well developed [Well Nourished] : well nourished [Peripheral vascular exam is grossly normal] : peripheral vascular exam is grossly normal [No Respiratory Distress] : no respiratory distress [Extension] : extension [] : positive Spurling [de-identified] : Cervical Spine Exam: \par    \par                                                 \par Constitutional:  \par - No acute distress  \par - Well developed; well nourished  \par   \par Neurological:  \par - normal mood and affect  \par - alert and oriented x 3   \par   \par Cardiovascular:  \par - grossly normal\par \par Inspection: \par   \par erythema (-)  \par ecchymosis (-)  \par rashes (-)  \par   \par Palpation:   \par Cervical paraspinal tenderness:     	R (-); L(-) \par Upper trapezius tenderness:          	R (-); L (-) \par Rhomboids tenderness:                  	R (-); L (-) \par Occipital Ridge:                                	R (-); L (-) \par Supraspinatus tenderness:             	R (-); L (-) \par   \par ROM:   \par ROM - full range of motion with mild stiffness \par Pain with  extension>flexion\par   \par Strength Testing:        	Right	Left \par Deltoid                             (5/5)    (5/5) \par Biceps:                            (5/5)    (5/5) \par Triceps:                           (5/5)    (5/5) \par Finger Abductors:           (5/5)    (5/5) \par Grasp:                             (5/5)    (5/5) \par   \par Special Testing: \par Spurling Test:              	R (Eq); L (Eq) \par Facet load test:           	R (+); L (+) \par Hoffmans:                               R (-); L(-)\par   \par Neuro: \par SILT throughout right upper extremity \par SILT throughout left upper extremity \par   \par Reflexes: \par Biceps   -       	R (2+); L (2+) \par Triceps  -       	R (2+); L (2+) \par Brachioradialis- R (2+); L (2+) \par   \par No ankle clonus\par

## 2023-04-18 ENCOUNTER — APPOINTMENT (OUTPATIENT)
Dept: PAIN MANAGEMENT | Facility: CLINIC | Age: 48
End: 2023-04-18
Payer: COMMERCIAL

## 2023-04-18 PROCEDURE — 62321 NJX INTERLAMINAR CRV/THRC: CPT

## 2023-04-18 NOTE — PROCEDURE
[FreeTextEntry3] : Date of Service: 04/18/2023 \par \par Account: [id number]\par \par Patient: PAIGE KWOK \par \par YOB: 1975\par \par Age: 47 year\par \par \par Surgeon:                                                         Hemal Hilton D.O.\par \par Pre-Operative Diagnosis:                            Cervical Radiculopathy (M54.12)\par \par Post-Operative Diagnosis:                          Cervical Radiculopathy (M54.12)\par \par Procedure:                                                     Interlaminar cervical epidural steroid injection (C7-T1) under fluoroscopic guidance\par \par Anesthesia:                                                    Local with MAC\par \par \par This procedure was carried out using fluoroscopic guidance.  The risks and benefits of the procedure were discussed extensively with the patient.  The consent of the patient was obtained and the following procedure was performed.\par \par The patient was placed in the prone position using thoracic and chin supports.  The cervical area was prepped and draped in a sterile fashion. A timeout was performed with all essential staff present and the site and side were verified. The fluoroscope visualized the C7-T1 interspace using slight cephalad-caudad angulation and this area was marked.  Using sterile technique, the superficial skin was anesthetized with 1% Lidocaine without epinephrine.  An 18-gauge Tuohy needle was advanced under fluoroscopy using [id number] technique until ligament was engaged.  The stylette was then removed and a column of preservative free normal saline flushed through the Tuohy needle and left with a concave fluid level above the butterfly portion of the Tuohy needle.  The needle was then advanced under fluoroscopic guidance until the column of saline disappeared.  Lateral view confirmed final needle tip placement in the epidural space.  After negative aspiration for heme and CSF, 1 cc of Omnipaque contrast injected under live fluoroscopy, confirmed good cervical epidurogram.  \par \par Cervical epidurogram showed no evidence of intrathecal or intravascular flow, and good bilateral epidural flow from C3 to T2 levels.  An injectate of 3 cc of preservative free normal saline plus 12 mg of betamethasone was then injected into the epidural space. The needle was subsequently removed and pressure was applied.\par \par Anesthesia personnel were present throughout the procedure.  The patient tolerated the procedure well and was instructed to contact me immediately if there were any problems.\par \par Hemal Hilton D.O.\par

## 2023-05-04 ENCOUNTER — APPOINTMENT (OUTPATIENT)
Dept: PAIN MANAGEMENT | Facility: CLINIC | Age: 48
End: 2023-05-04
Payer: COMMERCIAL

## 2023-05-04 VITALS — WEIGHT: 200 LBS | HEIGHT: 67 IN | BODY MASS INDEX: 31.39 KG/M2

## 2023-05-04 PROCEDURE — 99214 OFFICE O/P EST MOD 30 MIN: CPT

## 2023-05-04 NOTE — PHYSICAL EXAM
[de-identified] : Constitutional; Appears well, no apparent distress\par Ability to communicate: Normal \par Respiratory: non-labored breathing\par Skin: No rash noted\par Head: Normocephalic, atraumatic\par Neck: no visible thyroid enlargement\par Eyes: Extraocular movements intact\par Neurologic: Alert and oriented x3\par Psychiatric: normal mood, affect and behavior  [] : negative Spurling

## 2023-05-04 NOTE — DISCUSSION/SUMMARY
[de-identified] : After discussing various treatment options with the patient including but not limited to oral medications, physical therapy, exercise modalities as well as interventional spinal injections, we have decided with the following plan:\par \par - Continue Home exercises, stretching, activity modification, physical therapy, and conservative care.\par - MRI report and/or images was reviewed and discussed with the patient.\par - Recommend C7-T1 Cervical Epidural Steroid Injection under fluoroscopic guidance with image.\par - The risks, benefits and alternatives of the proposed procedure were explained in detail with the patient. The risks outlined include but are not limited to infection, bleeding, post-dural puncture headache, nerve injury, a temporary increase in pain, failure to resolve symptoms, allergic reaction, symptom recurrence, and possible elevation of blood sugar in diabetics. All questions were answered to patient's apparent satisfaction and he/she verbalized an understanding.\par - Patient is presenting with acute/sub-acute radicular pain with impairment in ADLs and functionality.  The pain has not responded to conservative care including NSAID therapy and/or physical therapy.  There is no bleeding tendency, unstable medical condition, or systemic infection.\par - Follow up in 1-2 weeks post injection for re-evaluation.\par

## 2023-05-04 NOTE — HISTORY OF PRESENT ILLNESS
[Neck] : neck [10] : 10 [Dull/Aching] : dull/aching [Constant] : constant [Household chores] : household chores [Sleep] : sleep [Meds] : meds [Physical therapy] : physical therapy [Nothing helps with pain getting better] : Nothing helps with pain getting better [Standing] : standing [Walking] : walking [Lying in bed] : lying in bed [FreeTextEntry1] : 05/04/2023: s/p C7-T1 AL  on 04/18/23 with 50% relief and improvement of ADLs. Pain is much less but still has numbness and tingling down the left arm.  \par \par Initial HPI 03/23/23:\par Pain started Dec 2022 and is on the left side of the neck and radiates to the left shoulder and down the left arm to the fingers described as a numbness and tingling. Saw Dr. Ross who recommended CARMELO and possible C5-6 ACDF. \par \par MRI Cervical Spine 2/15/23 independently reviewed: multilevel HNP with left C4,5,6,8 impingement \par Conservative Care: Has been doing PT which is helping\par Pain Medications: Methocarbamol and advil PRN \par Past Injections: none\par Spine surgery: none \par Blood thinners: none\par  [] : This patient has had an injection before: no [FreeTextEntry7] : left arm and fingers  [FreeTextEntry9] : muscle relaxer  [de-identified] : c mri

## 2023-05-05 ENCOUNTER — APPOINTMENT (OUTPATIENT)
Dept: ORTHOPEDIC SURGERY | Facility: CLINIC | Age: 48
End: 2023-05-05
Payer: COMMERCIAL

## 2023-05-05 VITALS — BODY MASS INDEX: 31.39 KG/M2 | HEIGHT: 67 IN | WEIGHT: 200 LBS

## 2023-05-05 PROCEDURE — 99214 OFFICE O/P EST MOD 30 MIN: CPT

## 2023-05-07 NOTE — HISTORY OF PRESENT ILLNESS
[7] : 7 [6] : 6 [] : yes [Not working due to injury] : Work status: not working due to injury [de-identified] : 5/5/2023: Patient presenting for a follow up of C Spine. Symptom suarez, he is improving transient from AL with Dr. Hilton. Indicated for second epidural. Complains of pain radiating throughout the left upper extremity. Overall, feels as if hes trending in a positive directed. Making progress PT, states gradual improvement of left UE strength. using Advil PRN, used muscle relaxer on one occasion. \par \par 4/3/23: Patient presenting for a follow up. Scheduled for AL with Dr. Hilton within the next few weeks. Severity of pain ranges day to day. Chief complaint is episodic pain radiating throughout the LT UE. Intermittent subjective weakness in the left upper extremity, but gradually improving with PT. Overall he is improving transient from PT. Continues treatment with muscle relaxer as needed at nighttime. \par \par 3/1/2023 - Patient presenting for a follow up. Progressive symptoms since his last visit. C/o arm pain and paresthesia extending throughout the left upper extremity. Difficulty lifting left arm due to subjective weakness. Symptoms tolerable in the morning, severity of pain gradually increases throughout the day. Relief from PT and muscle relaxer. He reports some improvement ROM cervical transient from PT. Treating with Advil. Symptomatic since late December. \par \par 02/17/2023 - 46 y/o M presenting for a test follow up of C spine. He continues to complain of left sided neck pain with radiation to his left trapezius. No pain extending past the trapezius. MDP and methocarbamol provided relief. \par \par 02/10/2023 - Patient presents to the office for evaluation of left sided neck pain with radiation to his left trapezius which started about 6 weeks ago which is similar to past problems.  Patient reports that this issue does interfere with daily activities and can be described as dull /throbbing/ache.  There are no associated symptoms including weakness/arm pain.  Patient reports neck extension exacerbate symptoms and not many treatments have improved symptoms.  He states that their symptoms have not improved since initial onset despite tx.  Patient is taking OTC NSAIDs and participating in chiropractic/acupuncture. Patient denies fevers, acute weakness, unexpected weight loss, urinary incontinence, and bowel incontinence.\par \par Subjective Weakness: No \par \par Numbness/Tingling: no\par Bladder/Bowel dysfunction: no\par Gait Abnormalities: no\par Fine motor coordination changes:  no\par \par  \par Injections: Yes - he believe he had CARMELO is the past   \par \par Pertinent Surgical History: N/A \par \par  [FreeTextEntry5] : Pain level @ rest   :  3/10\par Pain level w/ activist:  6-7/10\par Physical Therapy: ongoing - \par had CSI injection w/ Dr Hilton - still having the radiating pain to LT arms [FreeTextEntry7] : lt arm [de-identified] : pain mgmt, p/t

## 2023-05-07 NOTE — DATA REVIEWED
[MRI] : MRI [Cervical Spine] : cervical spine [Report was reviewed and noted in the chart] : The report was reviewed and noted in the chart [I independently reviewed and interpreted images and report] : I independently reviewed and interpreted images and report [I reviewed the films/CD and additionally noted] : I reviewed the films/CD and additionally noted [FreeTextEntry1] : I stop paperwork reviewed\par PAin mgmt progress notes reviewed

## 2023-05-07 NOTE — ASSESSMENT
[FreeTextEntry1] : This is a 47 year old male with cervical ddd, multi level left foraminal disc herniations with nerve impingement. Discussed continued medical mgmt and exercise based rehabilitation. Intermittent interventional spine injections, he will follow up with Dr. Hilton for 2nd injection. I advised the patient to continue his current PT trial, and to continue implementing the exercises they're teaching him into a daily routine - RX provided. Discussed medical mgmt, continue Methocarbamol as prescribed, judicious use nsaids .Discussed ACDF C5-6 (possible C4-5) if refractory to non operative mgmt, however he seems to be trending in a positive direction and strength is improving.  Patient remains temporarily and totally disabled from customary work at this time due to symptom severity. Patient will stay OOW. Note given. Follow up in 4 weeks. \par \par Prior to appointment and during encounter with patient extensive medical records were reviewed including but not limited to, hospital records, outpatient records, imaging results, and lab data. During this appointment the patient was examined, diagnoses were discussed and explained in a face to face manner. In addition extensive time was spent reviewing aforementioned diagnostic studies. Counseling including abnormal image results, differential diagnoses, treatment options, risk and benefits, lifestyle changes, current condition, and current medications was performed. Patient's comments, questions, and concerns were addressed and patient verbalized understanding. Based on this patient's presentation at our office, which is an orthopedic spine surgeon's office, this patient inherently / intrinsically has a risk, however minute, of developing issues such as Cauda equina syndrome, bowel and bladder changes, or progression of motor or neurological deficits such as paralysis which may be permanent.\par \par MEHNAZ MAGANA Acting as a Scribe for Dr. Moulton I, Mehnaz Magana, attest that this documentation has been prepared under the direction and in the presence of Provider Edgardo Ross MD. \par

## 2023-05-07 NOTE — PHYSICAL EXAM
[Normal Coordination] : normal coordination [Normal DTR UE/LE] : normal DTR UE/LE  [Normal Sensation] : normal sensation [Normal Mood and Affect] : normal mood and affect [Orientated] : orientated [Able to Communicate] : able to communicate [Normal Skin] : normal skin [No Rash] : no rash [No Ulcers] : no ulcers [No Lesions] : no lesions [No obvious lymphadenopathy in areas examined] : no obvious lymphadenopathy in areas examined [Well Developed] : well developed [Well Nourished] : well nourished [Peripheral vascular exam is grossly normal] : peripheral vascular exam is grossly normal [No Respiratory Distress] : no respiratory distress [Extension] : extension [] : positive Spurling [4___] : left biceps 4[unfilled]/5 [de-identified] : Cervical Spine Exam: \par    \par                                                 \par Constitutional:  \par - No acute distress  \par - Well developed; well nourished  \par   \par Neurological:  \par - normal mood and affect  \par - alert and oriented x 3   \par   \par Cardiovascular:  \par - grossly normal\par \par Inspection: \par   \par erythema (-)  \par ecchymosis (-)  \par rashes (-)  \par   \par Palpation:   \par Cervical paraspinal tenderness:     	R (-); L(-) \par Upper trapezius tenderness:          	R (-); L (-) \par Rhomboids tenderness:                  	R (-); L (-) \par Occipital Ridge:                                	R (-); L (-) \par Supraspinatus tenderness:             	R (-); L (-) \par   \par ROM:   \par ROM - full range of motion with mild stiffness \par Pain with  extension>flexion\par   \par Strength Testing:        	Right	Left \par Deltoid                             (5/5)    (5/5) \par Biceps:                            (5/5)    (5/5) \par Triceps:                           (5/5)    (5/5) \par Finger Abductors:           (5/5)    (5/5) \par Grasp:                             (5/5)    (5/5) \par   \par Special Testing: \par Spurling Test:              	R (Eq); L (Eq) \par Facet load test:           	R (+); L (+) \par Hoffmans:                               R (-); L(-)\par   \par Neuro: \par SILT throughout right upper extremity \par SILT throughout left upper extremity \par   \par Reflexes: \par Biceps   -       	R (2+); L (2+) \par Triceps  -       	R (2+); L (2+) \par Brachioradialis- R (2+); L (2+) \par   \par No ankle clonus\par

## 2023-05-22 ENCOUNTER — APPOINTMENT (OUTPATIENT)
Age: 48
End: 2023-05-22
Payer: COMMERCIAL

## 2023-05-22 PROCEDURE — 62321 NJX INTERLAMINAR CRV/THRC: CPT

## 2023-06-05 ENCOUNTER — APPOINTMENT (OUTPATIENT)
Dept: ORTHOPEDIC SURGERY | Facility: CLINIC | Age: 48
End: 2023-06-05
Payer: COMMERCIAL

## 2023-06-05 VITALS — BODY MASS INDEX: 31.39 KG/M2 | HEIGHT: 67 IN | WEIGHT: 200 LBS

## 2023-06-05 PROCEDURE — 99214 OFFICE O/P EST MOD 30 MIN: CPT

## 2023-06-08 ENCOUNTER — APPOINTMENT (OUTPATIENT)
Dept: PAIN MANAGEMENT | Facility: CLINIC | Age: 48
End: 2023-06-08

## 2023-06-14 NOTE — HISTORY OF PRESENT ILLNESS
[3] : 3 [0] : 0 [Not working due to injury] : Work status: not working due to injury [de-identified] : 06/05/2023 - Patient presenting for a follow up of C Spine. Symptom suarez, he is improving transient from 2nd AL with Dr. Hilton. Not using medications for pain at present. Patient reports no neurologic changes, no UE radic pains, no changes in UE strength. Making progress PT, states gradual improvement of left UE strength.\par \par 5/5/2023: Patient presenting for a follow up of C Spine. Symptom suarez, he is improving transient from AL with Dr. Hilton. Indicated for second epidural. Complains of pain radiating throughout the left upper extremity. Overall, feels as if hes trending in a positive directed. Making progress PT, states gradual improvement of left UE strength. using Advil PRN, used muscle relaxer on one occasion. \par \par 4/3/23: Patient presenting for a follow up. Scheduled for AL with Dr. Hilton within the next few weeks. Severity of pain ranges day to day. Chief complaint is episodic pain radiating throughout the LT UE. Intermittent subjective weakness in the left upper extremity, but gradually improving with PT. Overall he is improving transient from PT. Continues treatment with muscle relaxer as needed at nighttime. \par \par 3/1/2023 - Patient presenting for a follow up. Progressive symptoms since his last visit. C/o arm pain and paresthesia extending throughout the left upper extremity. Difficulty lifting left arm due to subjective weakness. Symptoms tolerable in the morning, severity of pain gradually increases throughout the day. Relief from PT and muscle relaxer. He reports some improvement ROM cervical transient from PT. Treating with Advil. Symptomatic since late December. \par \par 02/17/2023 - 46 y/o M presenting for a test follow up of C spine. He continues to complain of left sided neck pain with radiation to his left trapezius. No pain extending past the trapezius. MDP and methocarbamol provided relief. \par \par 02/10/2023 - Patient presents to the office for evaluation of left sided neck pain with radiation to his left trapezius which started about 6 weeks ago which is similar to past problems.  Patient reports that this issue does interfere with daily activities and can be described as dull /throbbing/ache.  There are no associated symptoms including weakness/arm pain.  Patient reports neck extension exacerbate symptoms and not many treatments have improved symptoms.  He states that their symptoms have not improved since initial onset despite tx.  Patient is taking OTC NSAIDs and participating in chiropractic/acupuncture. Patient denies fevers, acute weakness, unexpected weight loss, urinary incontinence, and bowel incontinence.\par \par Subjective Weakness: No \par \par Numbness/Tingling: no\par Bladder/Bowel dysfunction: no\par Gait Abnormalities: no\par Fine motor coordination changes:  no\par \par  \par Injections: Yes - he believe he had CARMELO is the past   \par \par Pertinent Surgical History: N/A \par \par  [de-identified] : p/t, pain mgmt- epidural

## 2023-06-14 NOTE — ASSESSMENT
[FreeTextEntry1] : This is a 47 year old male with cervical ddd, multi level left foraminal disc herniations with nerve impingement. Discussed continued medical mgmt and exercise based rehabilitation. Intermittent interventional spine injections, he will follow up with Dr. Hilton as directed and continue to asses positive response to 2nd AL. I advised the patient to continue his current PT trial, and to continue implementing the exercises they're teaching him into a daily routine .Discussed ACDF C5-6 (possible C4-5) if refractory to non operative mgmt, however he seems to be trending in a positive direction and strength is improving. Patient will return to work full duty no restrictions on 6/12/23 - work note provided. F/U PRN. \par \par Prior to appointment and during encounter with patient extensive medical records were reviewed including but not limited to, hospital records, outpatient records, imaging results, and lab data. During this appointment the patient was examined, diagnoses were discussed and explained in a face to face manner. In addition extensive time was spent reviewing aforementioned diagnostic studies. Counseling including abnormal image results, differential diagnoses, treatment options, risk and benefits, lifestyle changes, current condition, and current medications was performed. Patient's comments, questions, and concerns were addressed and patient verbalized understanding. Based on this patient's presentation at our office, which is an orthopedic spine surgeon's office, this patient inherently / intrinsically has a risk, however minute, of developing issues such as Cauda equina syndrome, bowel and bladder changes, or progression of motor or neurological deficits such as paralysis which may be permanent.\par \par MEHNAZ MAGANA Acting as a Scribe for Dr. Ross\micah I, Mehnaz Magana, attest that this documentation has been prepared under the direction and in the presence of Provider Edgardo Ross MD. \par

## 2023-06-14 NOTE — DATA REVIEWED
[Cervical Spine] : cervical spine [Report was reviewed and noted in the chart] : The report was reviewed and noted in the chart [I independently reviewed and interpreted images and report] : I independently reviewed and interpreted images and report [I reviewed the films/CD and additionally noted] : I reviewed the films/CD and additionally noted [FreeTextEntry1] : I stop paperwork reviewed\par Pain mgmt progress notes reviewed

## 2023-06-14 NOTE — PHYSICAL EXAM
[Normal Coordination] : normal coordination [Normal DTR UE/LE] : normal DTR UE/LE  [Normal Sensation] : normal sensation [Normal Mood and Affect] : normal mood and affect [Orientated] : orientated [Able to Communicate] : able to communicate [Normal Skin] : normal skin [No Rash] : no rash [No Ulcers] : no ulcers [No Lesions] : no lesions [No obvious lymphadenopathy in areas examined] : no obvious lymphadenopathy in areas examined [Well Developed] : well developed [Well Nourished] : well nourished [Peripheral vascular exam is grossly normal] : peripheral vascular exam is grossly normal [No Respiratory Distress] : no respiratory distress [Extension] : extension [4___] : left biceps 4[unfilled]/5 [] : positive Spurling [de-identified] : Cervical Spine Exam: \par    \par                                                 \par Constitutional:  \par - No acute distress  \par - Well developed; well nourished  \par   \par Neurological:  \par - normal mood and affect  \par - alert and oriented x 3   \par   \par Cardiovascular:  \par - grossly normal\par \par Inspection: \par   \par erythema (-)  \par ecchymosis (-)  \par rashes (-)  \par   \par Palpation:   \par Cervical paraspinal tenderness:     	R (-); L(-) \par Upper trapezius tenderness:          	R (-); L (-) \par Rhomboids tenderness:                  	R (-); L (-) \par Occipital Ridge:                                	R (-); L (-) \par Supraspinatus tenderness:             	R (-); L (-) \par   \par ROM:   \par ROM - full range of motion with mild stiffness \par Pain with  extension>flexion\par   \par Strength Testing:        	Right	Left \par Deltoid                             (5/5)    (5/5) \par Biceps:                            (5/5)    (5/5) \par Triceps:                           (5/5)    (5/5) \par Finger Abductors:           (5/5)    (5/5) \par Grasp:                             (5/5)    (5/5) \par   \par Special Testing: \par Spurling Test:              	R (Eq); L (Eq) \par Facet load test:           	R (+); L (+) \par Hoffmans:                               R (-); L(-)\par   \par Neuro: \par SILT throughout right upper extremity \par SILT throughout left upper extremity \par   \par Reflexes: \par Biceps   -       	R (2+); L (2+) \par Triceps  -       	R (2+); L (2+) \par Brachioradialis- R (2+); L (2+) \par   \par No ankle clonus\par

## 2023-07-13 NOTE — ASSESSMENT
[FreeTextEntry1] : This is a 47 year old male with cervical ddd, multi level left foraminal disc herniations with nerve impingement. Discussed continued medical mgmt and exercise based rehabilitation. Intermittent interventional spine injections, he will follow up with Dr. Hilton for injection. I advised the patient to continue his current PT trial, and to continue implementing the exercises they're teaching him into a daily routine. Discussed medical mgmt, continue Methocarbamol as prescribed, judicious use nsaids .Discussed ACDF C5-6 (possible C4-5) if refractory to non operative mgmt.. Patient remains temporarily and totally disabled from customary work at this time due to symptom severity. Patient will stay OOW. Note given. Follow up in 4 weeks. \par \par Prior to appointment and during encounter with patient extensive medical records were reviewed including but not limited to, hospital records, outpatient records, imaging results, and lab data. During this appointment the patient was examined, diagnoses were discussed and explained in a face to face manner. In addition extensive time was spent reviewing aforementioned diagnostic studies. Counseling including abnormal image results, differential diagnoses, treatment options, risk and benefits, lifestyle changes, current condition, and current medications was performed. Patient's comments, questions, and concerns were addressed and patient verbalized understanding. Based on this patient's presentation at our office, which is an orthopedic spine surgeon's office, this patient inherently / intrinsically has a risk, however minute, of developing issues such as Cauda equina syndrome, bowel and bladder changes, or progression of motor or neurological deficits such as paralysis which may be permanent.\par \par MEHNAZ MAGANA Acting as a Scribe for Dr. Ross\micah I, Mehnaz Magana, attest that this documentation has been prepared under the direction and in the presence of Provider Edgardo Ross MD. \par  80

## 2024-01-29 ENCOUNTER — APPOINTMENT (OUTPATIENT)
Dept: ORTHOPEDIC SURGERY | Facility: CLINIC | Age: 49
End: 2024-01-29
Payer: COMMERCIAL

## 2024-01-29 VITALS — WEIGHT: 200 LBS | BODY MASS INDEX: 31.39 KG/M2 | HEIGHT: 67 IN

## 2024-01-29 PROCEDURE — 99213 OFFICE O/P EST LOW 20 MIN: CPT

## 2024-02-04 NOTE — DATA REVIEWED
[MRI] : MRI [Cervical Spine] : cervical spine [Report was reviewed and noted in the chart] : The report was reviewed and noted in the chart [I independently reviewed and interpreted images and report] : I independently reviewed and interpreted images and report [FreeTextEntry1] : I stop paperwork reviewed Pain mgmt progress notes reviewed

## 2024-02-04 NOTE — DISCUSSION/SUMMARY
[de-identified] : Patient has previously experienced good results with cervical injection approximately eight months ago, and he would like to pursue additional cervical intervention. Recommend repeat, cervical CARMELO versus facet injection and possible radio frequency ablation. Patient will follow up after management procedure to review results.  Prior to appointment and during encounter with patient extensive medical records were reviewed including but not limited to, hospital records, outpatient records, imaging results, and lab data.During this appointment the patient was examined, diagnoses were discussed and explained in a face to face manner. In addition extensive time was spent reviewing aforementioned diagnostic studies. Counseling including abnormal image results, differential diagnoses, treatment options, risk and benefits, lifestyle changes, current condition, and current medications was performed. Patient's comments, questions, and concerns were addressed and patient verbalized understanding. Based on this patient's presentation at our office, which is an orthopedic spine surgeon's office, this patient inherently / intrinsically has a risk, however minute, of developing issues such as Cauda equina syndrome, bowel and bladder changes, or progression of motor or neurological deficits such as paralysis which may be permanent.

## 2024-02-04 NOTE — HISTORY OF PRESENT ILLNESS
[7] : 7 [5] : 5 [Full time] : Work status: full time [de-identified] : 01/29/2024 - Patient reports continued neck pain for the last several months. Denies numbness, tingling weakness in the arms. He reports that he had good resolution of his left arm pain and left arm weakness with prior cervical injection. He continues to work as alignment. He is not currently reporting any difficulty using tools. He does find difficulty extending his neck and working overhead at times. [de-identified] : no treatment

## 2024-02-04 NOTE — PHYSICAL EXAM
[Normal Coordination] : normal coordination [Normal DTR UE/LE] : normal DTR UE/LE  [Normal Sensation] : normal sensation [Normal Mood and Affect] : normal mood and affect [Orientated] : orientated [Able to Communicate] : able to communicate [Normal Skin] : normal skin [No Rash] : no rash [No Ulcers] : no ulcers [No Lesions] : no lesions [No obvious lymphadenopathy in areas examined] : no obvious lymphadenopathy in areas examined [Well Developed] : well developed [Peripheral vascular exam is grossly normal] : peripheral vascular exam is grossly normal [No Respiratory Distress] : no respiratory distress [Lungs clear to auscultation bilaterally] : lungs clear to auscultation bilaterally [Normal Bowel Sounds] : normal bowel sounds [Non-Tender] : non-tender [No HSM] : no HSM [No Mass] : no mass [Biceps 2+] : biceps 2+ [Triceps 2+] : triceps 2+ [Brachioradialis 2+] : brachioradialis 2+ [] : no rhomboid tenderness [FreeTextEntry9] : painful cervical range of motion, Particularly left rotation. He has 45 rotation bilaterally 15 lateral bending bilaterally flexion 50 extension to 30.

## 2024-02-05 ENCOUNTER — APPOINTMENT (OUTPATIENT)
Dept: PAIN MANAGEMENT | Facility: CLINIC | Age: 49
End: 2024-02-05
Payer: COMMERCIAL

## 2024-02-05 VITALS — WEIGHT: 200 LBS | BODY MASS INDEX: 31.39 KG/M2 | HEIGHT: 67 IN

## 2024-02-05 PROCEDURE — 99214 OFFICE O/P EST MOD 30 MIN: CPT

## 2024-02-05 NOTE — DISCUSSION/SUMMARY
[de-identified] : After discussing various treatment options with the patient including but not limited to oral medications, physical therapy, exercise modalities as well as interventional spinal injections, we have decided with the following plan:  - Continue Home exercises, stretching, activity modification, physical therapy, and conservative care. - MRI report and/or images was reviewed and discussed with the patient. - Recommend C7-T1 Cervical Epidural Steroid Injection under fluoroscopic guidance with image. - The risks, benefits and alternatives of the proposed procedure were explained in detail with the patient. The risks outlined include but are not limited to infection, bleeding, post-dural puncture headache, nerve injury, a temporary increase in pain, failure to resolve symptoms, allergic reaction, symptom recurrence, and possible elevation of blood sugar in diabetics. All questions were answered to patient's apparent satisfaction and he/she verbalized an understanding. - Patient is presenting with acute/sub-acute radicular pain with impairment in ADLs and functionality.  The pain has not responded to conservative care including NSAID therapy and/or physical therapy.  There is no bleeding tendency, unstable medical condition, or systemic infection. - Follow up in 1-2 weeks post injection for re-evaluation.

## 2024-02-05 NOTE — HISTORY OF PRESENT ILLNESS
[Neck] : neck [Dull/Aching] : dull/aching [Household chores] : household chores [Sleep] : sleep [Meds] : meds [Physical therapy] : physical therapy [Nothing helps with pain getting better] : Nothing helps with pain getting better [Standing] : standing [Walking] : walking [Lying in bed] : lying in bed [8] : 8 [7] : 7 [Intermittent] : intermittent [FreeTextEntry1] : 02/05/2024: s/p C7-T1 AL on 05/22/24 with >70% relief and improvement of ADLs. Pain is now mostly in the neck radiating to the left shoulder.   05/04/2023: s/p C7-T1 AL on 04/18/23 with 50% relief and improvement of ADLs. Pain is much less but still has numbness and tingling down the left arm.    Initial HPI 03/23/23: Pain started Dec 2022 and is on the left side of the neck and radiates to the left shoulder and down the left arm to the fingers described as a numbness and tingling. Saw Dr. Ross who recommended CARMELO and possible C5-6 ACDF.   MRI Cervical Spine 2/15/23 independently reviewed: multilevel HNP with left C4,5,6,8 impingement  Conservative Care: Has been doing PT which is helping Pain Medications: Methocarbamol and advil PRN  Past Injections: none Spine surgery: none  Blood thinners: none  [] : no [FreeTextEntry7] : left arm and fingers  [FreeTextEntry9] : muscle relaxer  [de-identified] : c mri  [TWNoteComboBox1] : 80%

## 2024-02-05 NOTE — PHYSICAL EXAM
[de-identified] : Constitutional; Appears well, no apparent distress\par  Ability to communicate: Normal \par  Respiratory: non-labored breathing\par  Skin: No rash noted\par  Head: Normocephalic, atraumatic\par  Neck: no visible thyroid enlargement\par  Eyes: Extraocular movements intact\par  Neurologic: Alert and oriented x3\par  Psychiatric: normal mood, affect and behavior  [] : negative Spurling

## 2024-02-27 ENCOUNTER — APPOINTMENT (OUTPATIENT)
Dept: PAIN MANAGEMENT | Facility: CLINIC | Age: 49
End: 2024-02-27
Payer: COMMERCIAL

## 2024-02-27 PROCEDURE — 62321 NJX INTERLAMINAR CRV/THRC: CPT

## 2024-02-27 NOTE — PROCEDURE
[FreeTextEntry3] : Date of Service: 02/27/2024   Account: [id number]  Patient: PAIGE KWOK   YOB: 1975  Age: 48 year   Surgeon:                                                         Hemal Hilton D.O.  Pre-Operative Diagnosis:                            Cervical Radiculopathy (M54.12)  Post-Operative Diagnosis:                          Cervical Radiculopathy (M54.12)  Procedure:                                                     Interlaminar cervical epidural steroid injection (C7-T1) under fluoroscopic guidance  Anesthesia:                                                    Local with MAC   This procedure was carried out using fluoroscopic guidance.  The risks and benefits of the procedure were discussed extensively with the patient.  The consent of the patient was obtained and the following procedure was performed.  The patient was placed in the prone position using thoracic and chin supports.  The cervical area was prepped and draped in a sterile fashion. A timeout was performed with all essential staff present and the site and side were verified. The fluoroscope visualized the C7-T1 interspace using slight cephalad-caudad angulation and this area was marked.  Using sterile technique, the superficial skin was anesthetized with 1% Lidocaine without epinephrine.  An 18-gauge Tuohy needle was advanced under fluoroscopy using [id number] technique until ligament was engaged.  The stylette was then removed and a column of preservative free normal saline flushed through the Tuohy needle and left with a concave fluid level above the butterfly portion of the Tuohy needle.  The needle was then advanced under fluoroscopic guidance until the column of saline disappeared.  Lateral view confirmed final needle tip placement in the epidural space.  After negative aspiration for heme and CSF, 1 cc of Omnipaque contrast injected under live fluoroscopy, confirmed good cervical epidurogram.    Cervical epidurogram showed no evidence of intrathecal or intravascular flow, and good bilateral epidural flow from C3 to T2 levels.  An injectate of 3 cc of preservative free normal saline plus 12 mg of betamethasone was then injected into the epidural space. The needle was subsequently removed and pressure was applied.  Anesthesia personnel were present throughout the procedure.  The patient tolerated the procedure well and was instructed to contact me immediately if there were any problems.  Hemal Hilton D.O.

## 2024-03-11 ENCOUNTER — APPOINTMENT (OUTPATIENT)
Dept: PAIN MANAGEMENT | Facility: CLINIC | Age: 49
End: 2024-03-11

## 2024-03-18 ENCOUNTER — APPOINTMENT (OUTPATIENT)
Dept: PAIN MANAGEMENT | Facility: CLINIC | Age: 49
End: 2024-03-18
Payer: COMMERCIAL

## 2024-03-18 VITALS — HEIGHT: 67 IN | BODY MASS INDEX: 31.39 KG/M2 | WEIGHT: 200 LBS

## 2024-03-18 DIAGNOSIS — M54.2 CERVICALGIA: ICD-10-CM

## 2024-03-18 PROCEDURE — 99214 OFFICE O/P EST MOD 30 MIN: CPT

## 2024-03-18 NOTE — PHYSICAL EXAM
[de-identified] : Constitutional; Appears well, no apparent distress\par  Ability to communicate: Normal \par  Respiratory: non-labored breathing\par  Skin: No rash noted\par  Head: Normocephalic, atraumatic\par  Neck: no visible thyroid enlargement\par  Eyes: Extraocular movements intact\par  Neurologic: Alert and oriented x3\par  Psychiatric: normal mood, affect and behavior  [] : no trapezial tenderness

## 2024-03-18 NOTE — HISTORY OF PRESENT ILLNESS
[Neck] : neck [Dull/Aching] : dull/aching [Household chores] : household chores [Physical therapy] : physical therapy [Meds] : meds [Sleep] : sleep [Nothing helps with pain getting better] : Nothing helps with pain getting better [Standing] : standing [Lying in bed] : lying in bed [Walking] : walking [5] : 5 [4] : 4 [Constant] : constant [Injection therapy] : injection therapy [FreeTextEntry1] : 03/18/2024: s/p C7-T1 AL on 02/27/24 with 60% relief and improvement of ADLs.   02/05/2024: s/p C7-T1 AL on 05/22/23 with >70% relief and improvement of ADLs. Pain is now mostly in the neck radiating to the left shoulder.   05/04/2023: s/p C7-T1 AL on 04/18/23 with 50% relief and improvement of ADLs. Pain is much less but still has numbness and tingling down the left arm.    Initial HPI 03/23/23: Pain started Dec 2022 and is on the left side of the neck and radiates to the left shoulder and down the left arm to the fingers described as a numbness and tingling. Saw Dr. Ross who recommended CARMELO and possible C5-6 ACDF.   MRI Cervical Spine 2/15/23 independently reviewed: multilevel HNP with left C4,5,6,8 impingement  Conservative Care: Has been doing PT which is helping Pain Medications: Methocarbamol and advil PRN  Past Injections: none Spine surgery: none  Blood thinners: none  [] : no [FreeTextEntry6] : stiffness [FreeTextEntry9] : muscle relaxer  [TWNoteComboBox1] : 80%

## 2024-03-21 ENCOUNTER — APPOINTMENT (OUTPATIENT)
Dept: ORTHOPEDIC SURGERY | Facility: CLINIC | Age: 49
End: 2024-03-21
Payer: COMMERCIAL

## 2024-03-21 VITALS — WEIGHT: 200 LBS | BODY MASS INDEX: 31.39 KG/M2 | HEIGHT: 67 IN

## 2024-03-21 DIAGNOSIS — M25.551 PAIN IN RIGHT HIP: ICD-10-CM

## 2024-03-21 DIAGNOSIS — M25.552 PAIN IN RIGHT HIP: ICD-10-CM

## 2024-03-21 PROCEDURE — 73522 X-RAY EXAM HIPS BI 3-4 VIEWS: CPT

## 2024-03-21 PROCEDURE — 99214 OFFICE O/P EST MOD 30 MIN: CPT

## 2024-03-21 RX ORDER — VENLAFAXINE HCL 75 MG
75 TABLET ORAL
Refills: 0 | Status: ACTIVE | COMMUNITY

## 2024-03-21 RX ORDER — MELOXICAM 15 MG/1
15 TABLET ORAL
Qty: 30 | Refills: 1 | Status: ACTIVE | COMMUNITY
Start: 2024-03-21 | End: 1900-01-01

## 2024-03-21 RX ORDER — ATORVASTATIN CALCIUM 10 MG/1
10 TABLET, FILM COATED ORAL
Refills: 0 | Status: ACTIVE | COMMUNITY

## 2024-03-21 NOTE — PHYSICAL EXAM
[] : light touch intact throughout [Bilateral] : hip with pelvis bilaterally [Moderate arthritis (Tonnis Grade 2)] : Moderate arthritis (Tonnis Grade 2)

## 2024-03-21 NOTE — HISTORY OF PRESENT ILLNESS
[Gradual] : gradual [8] : 8 [Localized] : localized [0] : 0 [Sharp] : sharp [Stabbing] : stabbing [Sleep] : sleep [Intermittent] : intermittent [Work] : work [Ice] : ice [Meds] : meds [Walking] : walking [Lying in bed] : lying in bed [Not working due to injury] : Work status: not working due to injury [de-identified] : 48M p/w rafael hip pain. complains of groin pain on both sides, right longer than left, was told he would need a hip replacement 13 years ago but was too young at the time. He is a  for [employer] and work aggravates his pain. [] : no [FreeTextEntry1] : bilateral hips  [FreeTextEntry2] : started over 13 years ago [FreeTextEntry9] : CSI , Tramadol [de-identified] : lying on side, turning full body [de-identified] : 10 years ago [de-identified] : none in the past 5 years [de-identified] : charito [de-identified] : both hips [de-identified] : CSI

## 2024-03-21 NOTE — DISCUSSION/SUMMARY
[de-identified] : The patient's current medication management of their orthopedic diagnosis was reviewed today:   (1) We discussed a comprehensive treatment plan that included possible pharmaceutical management involving the use of prescription strength medications including but not limited to options such as oral Naprosyn 500mg BID, once daily Meloxicam 15 mg, or 500-650 mg Tylenol versus over the counter oral medications and topical prescription NSAID Pennsaid vs over the counter Voltaren gel.   (2) There is a moderate risk of morbidity with further treatment, especially from use of prescription strength medications and possible side effects of these medications which include upset stomach with oral medications, skin reactions to topical medications and cardiac/renal issues with long term use.   (3) I recommended that the patient follow-up with their medical physician to discuss any significant specific potential issues with long term medication use such as interactions with current medications or with exacerbation of underlying medical comorbidities.   (4) The benefits and risks associated with use of injectable, oral or topical, prescription and over the counter anti-inflammatory medications were discussed with the patient. The patient voiced understanding of the risks including but not limited to bleeding, stroke, kidney dysfunction, heart disease, and were referred to the black box warning label for further information.  Prior to appointment and during encounter with patient extensive medical records were reviewed including but not limited to, hospital records, out patient records, imaging results, and lab data. During this appointment the patient was examined, diagnoses were discussed and explained in a face to face manner. In addition extensive time was spent reviewing aforementioned diagnostic studies. Counseling including abnormal image results, differential diagnoses, treatment options, risk and benefits, lifestyle changes, current condition, and current medications was performed. Patient's comments, questions, and concerns were address and patient verbalized understanding.

## 2024-03-21 NOTE — ASSESSMENT
[FreeTextEntry1] : 48M p/w rafael hip OA  Begin PT Meloxicam for pain return 6 weeks   The natural progression of Osteoarthritis was explained to the patient. We discussed the possible treatment options from conservative to operative. These included NSAIDS, Glucosamine and Chondrotin sulfate, and Physical Therapy. We also discussed that at some point they may progress to needed a ASTER. Information and pamphlets were given.

## 2024-03-28 ENCOUNTER — APPOINTMENT (OUTPATIENT)
Dept: PAIN MANAGEMENT | Facility: CLINIC | Age: 49
End: 2024-03-28
Payer: COMMERCIAL

## 2024-03-28 DIAGNOSIS — M54.12 RADICULOPATHY, CERVICAL REGION: ICD-10-CM

## 2024-03-28 PROCEDURE — 62321 NJX INTERLAMINAR CRV/THRC: CPT

## 2024-03-28 NOTE — PROCEDURE
[FreeTextEntry3] : Date of Service: 03/28/2024   Account: [id number]  Patient: PAIGE KWOK   YOB: 1975  Age: 48 year   Surgeon:                                                         Hemal Hilton D.O.  Pre-Operative Diagnosis:                            Cervical Radiculopathy (M54.12)  Post-Operative Diagnosis:                          Cervical Radiculopathy (M54.12)  Procedure:                                                     Interlaminar cervical epidural steroid injection (C7-T1) under fluoroscopic guidance  Anesthesia:                                                    Local with MAC   This procedure was carried out using fluoroscopic guidance.  The risks and benefits of the procedure were discussed extensively with the patient.  The consent of the patient was obtained and the following procedure was performed.  The patient was placed in the prone position using thoracic and chin supports.  The cervical area was prepped and draped in a sterile fashion. A timeout was performed with all essential staff present and the site and side were verified. The fluoroscope visualized the C7-T1 interspace using slight cephalad-caudad angulation and this area was marked.  Using sterile technique, the superficial skin was anesthetized with 1% Lidocaine without epinephrine.  An 18-gauge Tuohy needle was advanced under fluoroscopy using lztkr-[id number]-[id number] technique until ligament was engaged.  The stylette was then removed and a column of preservative free normal saline flushed through the Tuohy needle and left with a concave fluid level above the butterfly portion of the Tuohy needle.  The needle was then advanced under fluoroscopic guidance until the column of saline disappeared.  Lateral view confirmed final needle tip placement in the epidural space.  After negative aspiration for heme and CSF, 1 cc of Omnipaque contrast injected under live fluoroscopy, confirmed good cervical epidurogram.    Cervical epidurogram showed no evidence of intrathecal or intravascular flow, and good bilateral epidural flow from C3 to T2 levels.  An injectate of 3 cc of preservative free normal saline plus 12 mg of betamethasone was then injected into the epidural space. The needle was subsequently removed and pressure was applied.  Anesthesia personnel were present throughout the procedure.  The patient tolerated the procedure well and was instructed to contact me immediately if there were any problems.  Hemal Hilton D.O.

## 2024-04-15 ENCOUNTER — APPOINTMENT (OUTPATIENT)
Dept: PAIN MANAGEMENT | Facility: CLINIC | Age: 49
End: 2024-04-15

## 2024-04-26 ENCOUNTER — APPOINTMENT (OUTPATIENT)
Dept: ORTHOPEDIC SURGERY | Facility: CLINIC | Age: 49
End: 2024-04-26
Payer: COMMERCIAL

## 2024-04-26 DIAGNOSIS — M16.0 BILATERAL PRIMARY OSTEOARTHRITIS OF HIP: ICD-10-CM

## 2024-04-26 PROCEDURE — 99215 OFFICE O/P EST HI 40 MIN: CPT

## 2024-04-26 NOTE — HISTORY OF PRESENT ILLNESS
[Gradual] : gradual [8] : 8 [0] : 0 [Localized] : localized [Sharp] : sharp [Stabbing] : stabbing [Intermittent] : intermittent [Work] : work [Sleep] : sleep [Meds] : meds [Ice] : ice [Walking] : walking [Lying in bed] : lying in bed [Not working due to injury] : Work status: not working due to injury [de-identified] : 48M p/w rafael hip pain. complains of groin pain on both sides, right longer than left, was told he would need a hip replacement 13 years ago but was too young at the time. He is a  for [employer] and work aggravates his pain.  04/26/24: pt is here today for follow up on rafael hips. pain on right worse then left and worsening since last visit [] : no [FreeTextEntry1] : bilateral hips  [FreeTextEntry2] : started over 13 years ago [FreeTextEntry9] : CSI , Tramadol [de-identified] : lying on side, turning full body [de-identified] : 10 years ago [de-identified] : none in the past 5 years [de-identified] : charito [de-identified] : both hips [de-identified] : CSI

## 2024-04-26 NOTE — PHYSICAL EXAM
[] : mildly antalgic [Bilateral] : hip with pelvis bilaterally [Moderate arthritis (Tonnis Grade 2)] : Moderate arthritis (Tonnis Grade 2)

## 2024-04-26 NOTE — ASSESSMENT
[FreeTextEntry1] : 48M p/w rafael hip OA, severe exacerbation  He would like to proceed with R ASTER, r b a explained to patient, we will call to schedule   We discussed my findings and the natural history of their condition. We talked about the details of the proposed surgery and the recovery. We discussed the material risks, possible benefits and alternatives to surgery. The risks include but are not limited to infection, bleeding and possible need for blood transfusion, fracture, bowel blockage, bladder retention or infection, need for reoperation, stiffness and/or limited range of motion, possible damage to nerves and blood vessels, failure of fixation of components, risk of deep vein thromboses and pulmonary embolism, wound healing problems, dislocation, and possible leg length discrepancy. Although incredibly rare, we also discussed the risks of a cardiac event, stroke and even death during, or following, the surgery. We discussed the type of implants the patient will be receiving and the type of fixation that will be used, as well as whether a robot or computer navigation aide will be used. The patient understands they will need medical clearance and will attend a preoperative joint education class. We also discussed the type of anesthesia they will receive, and the risks associated with hospital or rehab length of stay, obesity, diabetes and smoking.

## 2024-05-02 ENCOUNTER — APPOINTMENT (OUTPATIENT)
Dept: ORTHOPEDIC SURGERY | Facility: CLINIC | Age: 49
End: 2024-05-02

## 2024-09-16 ENCOUNTER — APPOINTMENT (OUTPATIENT)
Dept: ORTHOPEDIC SURGERY | Facility: CLINIC | Age: 49
End: 2024-09-16
Payer: COMMERCIAL

## 2024-09-16 VITALS — HEIGHT: 67 IN | BODY MASS INDEX: 31.39 KG/M2 | WEIGHT: 200 LBS

## 2024-09-16 DIAGNOSIS — M19.041 PRIMARY OSTEOARTHRITIS, RIGHT HAND: ICD-10-CM

## 2024-09-16 PROCEDURE — 20600 DRAIN/INJ JOINT/BURSA W/O US: CPT | Mod: RT

## 2024-09-16 PROCEDURE — 73130 X-RAY EXAM OF HAND: CPT | Mod: RT

## 2024-09-16 PROCEDURE — 99213 OFFICE O/P EST LOW 20 MIN: CPT | Mod: 25

## 2024-09-16 NOTE — HISTORY OF PRESENT ILLNESS
[Localized] : localized [Sharp] : sharp [Stabbing] : stabbing [Frequent] : frequent [Rest] : rest [Meds] : meds [Injection therapy] : injection therapy [Full time] : Work status: full time [de-identified] : 46 year old male followed for RIGHT middle MP arthritis and sprain. Today he is c/o RT index finger pain  Occup: [employer]    [] : no [FreeTextEntry1] : right pointer and middle finger  [FreeTextEntry3] : ~ 2023 [FreeTextEntry5] : NKI, Patient states he will hit his hand against an object and see stars. [FreeTextEntry9] : Advil, CSI  [de-identified] : Banging into objects, making a fist  [de-identified] : 10/13/22 [de-identified] : CSI  [de-identified] : [employer]

## 2024-09-16 NOTE — DISCUSSION/SUMMARY
[de-identified] : Discussed the nature of the diagnosis and risk and benefits of different modalities of treatment. RT index & middle MP arthritis  X rays reviewed and discussed Discussed conservative management as symptoms demand vs CSI. Pt would like a CSI in index & middle MP  Done today and tolerated well. All questions answered. (2) potential problem

## 2024-09-16 NOTE — PROCEDURE
[Small Joint Injection] : small joint injection [Right] : of the right [2nd] : 2nd [3rd] : 3rd [Metacarpalphalangeal joint] : metacarpalphalangeal joint [Pain] : pain [Inflammation] : inflammation [Alcohol] : alcohol [Ethyl Chloride sprayed topically] : ethyl chloride sprayed topically [Sterile technique used] : sterile technique used [___ cc    1%] : Lidocaine ~Vcc of 1%  [___ cc    10mg] : Triamcinolone (Kenalog) ~Vcc of 10 mg  [Risks, benefits, alternatives discussed / Verbal consent obtained] : the risks benefits, and alternatives have been discussed, and verbal consent was obtained

## 2024-09-16 NOTE — HISTORY OF PRESENT ILLNESS
[Localized] : localized [Sharp] : sharp [Stabbing] : stabbing [Frequent] : frequent [Rest] : rest [Meds] : meds [Injection therapy] : injection therapy [Full time] : Work status: full time [de-identified] : 46 year old male followed for RIGHT middle MP arthritis and sprain. Today he is c/o RT index finger pain  Occup: [employer]    [] : no [FreeTextEntry1] : right pointer and middle finger  [FreeTextEntry3] : ~ 2023 [FreeTextEntry5] : NKI, Patient states he will hit his hand against an object and see stars. [FreeTextEntry9] : Advil, CSI  [de-identified] : Banging into objects, making a fist  [de-identified] : 10/13/22 [de-identified] : CSI  [de-identified] : [employer]

## 2024-09-16 NOTE — DISCUSSION/SUMMARY
[de-identified] : Discussed the nature of the diagnosis and risk and benefits of different modalities of treatment. RT index & middle MP arthritis  X rays reviewed and discussed Discussed conservative management as symptoms demand vs CSI. Pt would like a CSI in index & middle MP  Done today and tolerated well. All questions answered.

## 2024-12-30 ENCOUNTER — OUTPATIENT (OUTPATIENT)
Dept: OUTPATIENT SERVICES | Facility: HOSPITAL | Age: 49
LOS: 1 days | End: 2024-12-30
Payer: COMMERCIAL

## 2024-12-30 ENCOUNTER — APPOINTMENT (OUTPATIENT)
Dept: ORTHOPEDIC SURGERY | Facility: CLINIC | Age: 49
End: 2024-12-30
Payer: OTHER MISCELLANEOUS

## 2024-12-30 VITALS
RESPIRATION RATE: 18 BRPM | DIASTOLIC BLOOD PRESSURE: 86 MMHG | OXYGEN SATURATION: 98 % | SYSTOLIC BLOOD PRESSURE: 128 MMHG | WEIGHT: 209 LBS | HEIGHT: 67 IN | TEMPERATURE: 98 F | HEART RATE: 84 BPM

## 2024-12-30 VITALS — WEIGHT: 200 LBS | BODY MASS INDEX: 31.39 KG/M2 | HEIGHT: 67 IN

## 2024-12-30 DIAGNOSIS — M19.041 PRIMARY OSTEOARTHRITIS, RIGHT HAND: ICD-10-CM

## 2024-12-30 DIAGNOSIS — Z98.890 OTHER SPECIFIED POSTPROCEDURAL STATES: Chronic | ICD-10-CM

## 2024-12-30 DIAGNOSIS — E78.5 HYPERLIPIDEMIA, UNSPECIFIED: ICD-10-CM

## 2024-12-30 DIAGNOSIS — K82.4 CHOLESTEROLOSIS OF GALLBLADDER: ICD-10-CM

## 2024-12-30 DIAGNOSIS — Z01.818 ENCOUNTER FOR OTHER PREPROCEDURAL EXAMINATION: ICD-10-CM

## 2024-12-30 LAB
ALBUMIN SERPL ELPH-MCNC: 3.7 G/DL — SIGNIFICANT CHANGE UP (ref 3.3–5)
ALP SERPL-CCNC: 91 U/L — SIGNIFICANT CHANGE UP (ref 40–120)
ALT FLD-CCNC: 37 U/L — SIGNIFICANT CHANGE UP (ref 12–78)
ANION GAP SERPL CALC-SCNC: 7 MMOL/L — SIGNIFICANT CHANGE UP (ref 5–17)
AST SERPL-CCNC: 18 U/L — SIGNIFICANT CHANGE UP (ref 15–37)
BILIRUB SERPL-MCNC: 0.3 MG/DL — SIGNIFICANT CHANGE UP (ref 0.2–1.2)
BUN SERPL-MCNC: 22 MG/DL — SIGNIFICANT CHANGE UP (ref 7–23)
CALCIUM SERPL-MCNC: 9.4 MG/DL — SIGNIFICANT CHANGE UP (ref 8.5–10.1)
CHLORIDE SERPL-SCNC: 106 MMOL/L — SIGNIFICANT CHANGE UP (ref 96–108)
CO2 SERPL-SCNC: 27 MMOL/L — SIGNIFICANT CHANGE UP (ref 22–31)
CREAT SERPL-MCNC: 1.2 MG/DL — SIGNIFICANT CHANGE UP (ref 0.5–1.3)
EGFR: 73 ML/MIN/1.73M2 — SIGNIFICANT CHANGE UP
GLUCOSE SERPL-MCNC: 95 MG/DL — SIGNIFICANT CHANGE UP (ref 70–99)
HCT VFR BLD CALC: 40.6 % — SIGNIFICANT CHANGE UP (ref 39–50)
HGB BLD-MCNC: 13.6 G/DL — SIGNIFICANT CHANGE UP (ref 13–17)
MCHC RBC-ENTMCNC: 28.5 PG — SIGNIFICANT CHANGE UP (ref 27–34)
MCHC RBC-ENTMCNC: 33.5 G/DL — SIGNIFICANT CHANGE UP (ref 32–36)
MCV RBC AUTO: 84.9 FL — SIGNIFICANT CHANGE UP (ref 80–100)
NRBC # BLD: 0 /100 WBCS — SIGNIFICANT CHANGE UP (ref 0–0)
PLATELET # BLD AUTO: 330 K/UL — SIGNIFICANT CHANGE UP (ref 150–400)
POTASSIUM SERPL-MCNC: 4 MMOL/L — SIGNIFICANT CHANGE UP (ref 3.5–5.3)
POTASSIUM SERPL-SCNC: 4 MMOL/L — SIGNIFICANT CHANGE UP (ref 3.5–5.3)
PROT SERPL-MCNC: 7.5 G/DL — SIGNIFICANT CHANGE UP (ref 6–8.3)
RBC # BLD: 4.78 M/UL — SIGNIFICANT CHANGE UP (ref 4.2–5.8)
RBC # FLD: 13 % — SIGNIFICANT CHANGE UP (ref 10.3–14.5)
SODIUM SERPL-SCNC: 140 MMOL/L — SIGNIFICANT CHANGE UP (ref 135–145)
WBC # BLD: 8.7 K/UL — SIGNIFICANT CHANGE UP (ref 3.8–10.5)
WBC # FLD AUTO: 8.7 K/UL — SIGNIFICANT CHANGE UP (ref 3.8–10.5)

## 2024-12-30 PROCEDURE — 86900 BLOOD TYPING SEROLOGIC ABO: CPT

## 2024-12-30 PROCEDURE — 85027 COMPLETE CBC AUTOMATED: CPT

## 2024-12-30 PROCEDURE — G0463: CPT

## 2024-12-30 PROCEDURE — 86901 BLOOD TYPING SEROLOGIC RH(D): CPT

## 2024-12-30 PROCEDURE — 80053 COMPREHEN METABOLIC PANEL: CPT

## 2024-12-30 PROCEDURE — 36415 COLL VENOUS BLD VENIPUNCTURE: CPT

## 2024-12-30 PROCEDURE — 86850 RBC ANTIBODY SCREEN: CPT

## 2024-12-30 PROCEDURE — 20600 DRAIN/INJ JOINT/BURSA W/O US: CPT | Mod: RT

## 2024-12-30 NOTE — H&P PST ADULT - HISTORY OF PRESENT ILLNESS
50 yo M with h/o HLD c/o worsening of GERD symptoms x 3 years- had s/p PCP evaluation-endoscopy/ abdominal US demonstrated cholesterolosis. Pt sched 50 yo M with h/o HLD c/o worsening of GERD symptoms x 3 years- had s/p PCP evaluation-endoscopy/ abdominal US demonstrated cholesterolosis. Pt scheduled for robotic assisted laparoscopic cholecystectomy-umbilical hernia repair on 01/09/25    **Denies any fever, CP/SOB, abdominal pain , N/V or sick contacts

## 2024-12-30 NOTE — H&P PST ADULT - NSICDXPASTMEDICALHX_GEN_ALL_CORE_FT
PAST MEDICAL HISTORY:  Anxiety disorder     Gall bladder polyp     HLD (hyperlipidemia)     Osteoarthritis     Umbilical hernia

## 2024-12-30 NOTE — H&P PST ADULT - PROBLEM SELECTOR PLAN 1
scheduled for robotic assisted laparoscopic cholecystectomy-umbilical hernia repair on 01/09/25  Labs- CBC, CMP, T&S  pre op instructions discussed verbalized understanding  pre op PCP eval on 12/31/24

## 2024-12-30 NOTE — H&P PST ADULT - NEGATIVE CARDIOVASCULAR SYMPTOMS
no chest pain/no palpitations/no dyspnea on exertion/no orthopnea no chest pain/no palpitations/no dyspnea on exertion/no orthopnea/no paroxysmal nocturnal dyspnea

## 2025-01-08 RX ORDER — SODIUM CHLORIDE 9 MG/ML
1000 INJECTION, SOLUTION INTRAVENOUS
Refills: 0 | Status: DISCONTINUED | OUTPATIENT
Start: 2025-01-09 | End: 2025-01-09

## 2025-01-08 NOTE — ASU PATIENT PROFILE, ADULT - PREOP PAIN SCORE
Patient calling regarding his denied MRI, made him aware that Dr Zen Jennings is looking into peer to peer  He verbalizes understanding  Patient also asking Dr Zen Jennings if going to a chiropracter would benefit him or hurt him more at this point  Please advise  Patient would like to be updated on status of MRI as well       [phone]

## 2025-01-08 NOTE — ASU PATIENT PROFILE, ADULT - FALL HARM RISK - UNIVERSAL INTERVENTIONS
Bed in lowest position, wheels locked, appropriate side rails in place/Call bell, personal items and telephone in reach/Instruct patient to call for assistance before getting out of bed or chair/Non-slip footwear when patient is out of bed/[place] to call system/Physically safe environment - no spills, clutter or unnecessary equipment/Purposeful Proactive Rounding/Room/bathroom lighting operational, light cord in reach

## 2025-01-09 ENCOUNTER — TRANSCRIPTION ENCOUNTER (OUTPATIENT)
Age: 50
End: 2025-01-09

## 2025-01-09 ENCOUNTER — RESULT REVIEW (OUTPATIENT)
Age: 50
End: 2025-01-09

## 2025-01-09 ENCOUNTER — OUTPATIENT (OUTPATIENT)
Dept: OUTPATIENT SERVICES | Facility: HOSPITAL | Age: 50
LOS: 1 days | End: 2025-01-09
Payer: COMMERCIAL

## 2025-01-09 VITALS
SYSTOLIC BLOOD PRESSURE: 128 MMHG | TEMPERATURE: 98 F | OXYGEN SATURATION: 96 % | DIASTOLIC BLOOD PRESSURE: 78 MMHG | RESPIRATION RATE: 20 BRPM | HEART RATE: 78 BPM

## 2025-01-09 VITALS
RESPIRATION RATE: 15 BRPM | SYSTOLIC BLOOD PRESSURE: 108 MMHG | OXYGEN SATURATION: 97 % | DIASTOLIC BLOOD PRESSURE: 63 MMHG | HEART RATE: 78 BPM | TEMPERATURE: 98 F

## 2025-01-09 DIAGNOSIS — Z98.890 OTHER SPECIFIED POSTPROCEDURAL STATES: Chronic | ICD-10-CM

## 2025-01-09 DIAGNOSIS — K82.4 CHOLESTEROLOSIS OF GALLBLADDER: ICD-10-CM

## 2025-01-09 DIAGNOSIS — Z01.818 ENCOUNTER FOR OTHER PREPROCEDURAL EXAMINATION: ICD-10-CM

## 2025-01-09 LAB — ABO RH CONFIRMATION: SIGNIFICANT CHANGE UP

## 2025-01-09 PROCEDURE — 88302 TISSUE EXAM BY PATHOLOGIST: CPT | Mod: 26

## 2025-01-09 PROCEDURE — 36415 COLL VENOUS BLD VENIPUNCTURE: CPT

## 2025-01-09 PROCEDURE — 47563 LAPARO CHOLECYSTECTOMY/GRAPH: CPT | Mod: AS

## 2025-01-09 PROCEDURE — 88304 TISSUE EXAM BY PATHOLOGIST: CPT | Mod: 26

## 2025-01-09 PROCEDURE — C9399: CPT

## 2025-01-09 PROCEDURE — 47562 LAPAROSCOPIC CHOLECYSTECTOMY: CPT

## 2025-01-09 PROCEDURE — C1889: CPT

## 2025-01-09 PROCEDURE — 15830 EXC EXCESSIVE SKIN ABDOMEN: CPT | Mod: AS

## 2025-01-09 PROCEDURE — 88304 TISSUE EXAM BY PATHOLOGIST: CPT

## 2025-01-09 PROCEDURE — 88302 TISSUE EXAM BY PATHOLOGIST: CPT

## 2025-01-09 PROCEDURE — S2900: CPT

## 2025-01-09 DEVICE — SURGICEL SNOW 4 X 4": Type: IMPLANTABLE DEVICE | Status: FUNCTIONAL

## 2025-01-09 DEVICE — LIGATING CLIPS WECK HEMOLOK POLYMER MEDIUM-LARGE (GREEN) 6: Type: IMPLANTABLE DEVICE | Status: FUNCTIONAL

## 2025-01-09 RX ORDER — OXYCODONE HCL 15 MG
1 TABLET ORAL
Qty: 10 | Refills: 0
Start: 2025-01-09

## 2025-01-09 RX ORDER — INDOCYANINE GREEN 25 MG
5 KIT INTRAVENOUS ONCE
Refills: 0 | Status: COMPLETED | OUTPATIENT
Start: 2025-01-09 | End: 2025-01-09

## 2025-01-09 RX ORDER — ATORVASTATIN CALCIUM 40 MG/1
1 TABLET, FILM COATED ORAL
Refills: 0 | DISCHARGE

## 2025-01-09 RX ORDER — METOCLOPRAMIDE 10 MG/1
5 TABLET ORAL ONCE
Refills: 0 | Status: COMPLETED | OUTPATIENT
Start: 2025-01-09 | End: 2025-01-09

## 2025-01-09 RX ORDER — VENLAFAXINE HYDROCHLORIDE 75 MG/1
1 CAPSULE, EXTENDED RELEASE ORAL
Refills: 0 | DISCHARGE

## 2025-01-09 RX ORDER — OXYCODONE HCL 15 MG
5 TABLET ORAL ONCE
Refills: 0 | Status: DISCONTINUED | OUTPATIENT
Start: 2025-01-09 | End: 2025-01-09

## 2025-01-09 RX ORDER — CEFAZOLIN SODIUM 1 G
2000 VIAL (EA) INJECTION ONCE
Refills: 0 | Status: COMPLETED | OUTPATIENT
Start: 2025-01-09 | End: 2025-01-09

## 2025-01-09 RX ORDER — IBUPROFEN 200 MG
1 TABLET ORAL
Qty: 20 | Refills: 0
Start: 2025-01-09

## 2025-01-09 RX ORDER — SODIUM CHLORIDE 9 MG/ML
1000 INJECTION, SOLUTION INTRAVENOUS
Refills: 0 | Status: DISCONTINUED | OUTPATIENT
Start: 2025-01-09 | End: 2025-01-09

## 2025-01-09 RX ORDER — HYDROMORPHONE HCL 4 MG
0.5 TABLET ORAL
Refills: 0 | Status: DISCONTINUED | OUTPATIENT
Start: 2025-01-09 | End: 2025-01-09

## 2025-01-09 RX ORDER — ACETAMINOPHEN 80 MG/.8ML
2 SOLUTION/ DROPS ORAL
Qty: 20 | Refills: 0
Start: 2025-01-09

## 2025-01-09 RX ADMIN — Medication 5 MILLIGRAM(S): at 14:35

## 2025-01-09 RX ADMIN — INDOCYANINE GREEN 5 MILLIGRAM(S): KIT INTRAVENOUS at 10:29

## 2025-01-09 RX ADMIN — SODIUM CHLORIDE 75 MILLILITER(S): 9 INJECTION, SOLUTION INTRAVENOUS at 10:28

## 2025-01-09 RX ADMIN — METOCLOPRAMIDE 5 MILLIGRAM(S): 10 TABLET ORAL at 14:36

## 2025-01-09 NOTE — BRIEF OPERATIVE NOTE - NSICDXBRIEFPREOP_GEN_ALL_CORE_FT
PRE-OP DIAGNOSIS:  Polyp of gallbladder 09-Jan-2025 12:59:59  Leo Carmen  Incarcerated umbilical hernia 09-Jan-2025 13:00:16  Leo Carmen

## 2025-01-09 NOTE — BRIEF OPERATIVE NOTE - NSICDXBRIEFPOSTOP_GEN_ALL_CORE_FT
POST-OP DIAGNOSIS:  Incarcerated umbilical hernia 09-Jan-2025 13:00:30  Leo Carmen  Polyp of gallbladder 09-Jan-2025 13:00:22  Leo Carmen

## 2025-01-09 NOTE — ASU DISCHARGE PLAN (ADULT/PEDIATRIC) - FINANCIAL ASSISTANCE
[facility] provides services at a reduced cost to those who are determined to be eligible through [facility]’s financial assistance program. Information regarding [facility]’s financial assistance program can be found by going to [email or web address] or by calling [phone].

## 2025-01-09 NOTE — ASU DISCHARGE PLAN (ADULT/PEDIATRIC) - CARE PROVIDER_API CALL
Leo Carmen [facility]  [address], [address]  [place] [address]  Phone: [phone]  Fax: [phone]  Follow Up Time:

## 2025-01-09 NOTE — ASU DISCHARGE PLAN (ADULT/PEDIATRIC) - NS MD DC FALL RISK RISK
For information on Fall & Injury Prevention, visit: [email or web address] OR  [email or web address] OR  https://www.cdc.gov/steadi/patient.html

## 2025-01-09 NOTE — BRIEF OPERATIVE NOTE - NSICDXBRIEFPROCEDURE_GEN_ALL_CORE_FT
PROCEDURES:  Robot-assisted cholecystectomy with cholangiography 09-Jan-2025 12:59:30  Leo Carmen  Repair of umbilical hernia with lipectomy 09-Jan-2025 12:59:45  Leo Carmen

## 2025-01-13 LAB — SURGICAL PATHOLOGY STUDY: SIGNIFICANT CHANGE UP

## 2025-06-23 ENCOUNTER — APPOINTMENT (OUTPATIENT)
Dept: ORTHOPEDIC SURGERY | Facility: CLINIC | Age: 50
End: 2025-06-23
Payer: OTHER MISCELLANEOUS

## 2025-06-23 VITALS — HEIGHT: 67 IN | WEIGHT: 210 LBS | BODY MASS INDEX: 32.96 KG/M2

## 2025-06-23 PROCEDURE — 20600 DRAIN/INJ JOINT/BURSA W/O US: CPT

## 2025-06-23 PROCEDURE — 99213 OFFICE O/P EST LOW 20 MIN: CPT | Mod: 25

## (undated) DEVICE — XI DRAPE ARM

## (undated) DEVICE — TUBING ENDO EXT OLYMPUS 160 24HR USE

## (undated) DEVICE — SYR ORISE GEL SNGL PACK

## (undated) DEVICE — SOL IRR POUR H2O 500ML

## (undated) DEVICE — SYR LUER SLIP TIP 30CC

## (undated) DEVICE — ENDOCATCH 10MM

## (undated) DEVICE — CATH IV SAFE BC 22G X 1" (BLUE)

## (undated) DEVICE — BLADE SCALPEL SAFETYLOCK #15

## (undated) DEVICE — SOL IRR BAG NS 0.9% 1000ML

## (undated) DEVICE — FORCEP RADIAL JAW 4 W NDL 2.4MM 2.8MM 240CM ORANGE DISP

## (undated) DEVICE — ELCTR BOVIE TIP BLADE INSULATED 2.75" EDGE

## (undated) DEVICE — NDL INJ SCLERO INTERJECT 23G

## (undated) DEVICE — XI CANNULA SEAL 5MM - 8 MM

## (undated) DEVICE — TUBING STRYKER PNEUMOCLEAR HIGH FLOW

## (undated) DEVICE — SYR IV POSIFLUSH NS 3ML 30/TY

## (undated) DEVICE — SOL IRR POUR H2O 1000ML

## (undated) DEVICE — SENS OXI DGT OXISENSOR II ADLN

## (undated) DEVICE — VENODYNE/SCD SLEEVE CALF LARGE

## (undated) DEVICE — NDL HYPO REGULAR BEVEL 25G X 1.5" (BLUE)

## (undated) DEVICE — SUT HEWSON RETRIEVER

## (undated) DEVICE — STERIS DEFENDO 3-PIECE KIT (AIR/WATER, SUCTION & BIOPSY VALVES)

## (undated) DEVICE — PACK GENERAL LAPAROSCOPY

## (undated) DEVICE — WARMING BLANKET UPPER ADULT

## (undated) DEVICE — CANISTER SUCTION 1200CC 10/SL

## (undated) DEVICE — SUCTION YANKAUER TAPERED BULBOUS NO VENT

## (undated) DEVICE — STAPLER SKIN PROXIMATE

## (undated) DEVICE — XI OBTURATOR OPTICAL BLADELESS 8MM

## (undated) DEVICE — XI CORD BIPOLAR CAUTERY (BLUE)

## (undated) DEVICE — D HELP - CLEARVIEW CLEARIFY SYSTEM

## (undated) DEVICE — SOL IRR POUR NS 0.9% 1000ML

## (undated) DEVICE — ENDOCUFF VISION SZ 2 LG GRN

## (undated) DEVICE — XI ENDOWRIST SUCTION IRRIGATOR 8MM

## (undated) DEVICE — PLV-SCD MACHINE: Type: DURABLE MEDICAL EQUIPMENT

## (undated) DEVICE — TUBING CANNULA SALTER LABS NASAL ADULT 7FT

## (undated) DEVICE — TUBE O2 SUPL CRUSH RESIS CONN SOUTHSIDE ONLY

## (undated) DEVICE — ELCTR GROUNDING PAD ADULT COVIDIEN

## (undated) DEVICE — SMOKE EVACUTATION SYS LAPROSCOPIC AC/PA

## (undated) DEVICE — SUT POLYSORB 0 30" GU-45

## (undated) DEVICE — DRAPE TOWEL BLUE 17" X 24"

## (undated) DEVICE — SYR LUER SLIP TIP 50CC

## (undated) DEVICE — POLY TRAP ETRAP

## (undated) DEVICE — ENDOCUFF VISION SZ 3 SM PRPL

## (undated) DEVICE — TUBING IV SET SECONDARY 34"

## (undated) DEVICE — PLV/PSP-ESU FORCEFX F8I7418A: Type: DURABLE MEDICAL EQUIPMENT

## (undated) DEVICE — TRAP QUICK CATCH  SINGL CHAMBER

## (undated) DEVICE — XI DRAPE COLUMN

## (undated) DEVICE — TUBING IV SET GRAVITY 3Y 100" MACRO

## (undated) DEVICE — CATH IV SAFE BC 20G X 1.16" (PINK)

## (undated) DEVICE — XI ENDOWRIST 12 - 8 MM CANNULA REDUCER

## (undated) DEVICE — VALVE BIOPSY

## (undated) DEVICE — VENODYNE/SCD SLEEVE CALF MEDIUM

## (undated) DEVICE — MARKER ENDO SPOT EX

## (undated) DEVICE — Device

## (undated) DEVICE — XI 12MM AND STAPLER CANNULA SEAL

## (undated) DEVICE — MASK O2 NON REBREATH 3IN1 ADULT

## (undated) DEVICE — MASK OXYGEN PANORAMIC

## (undated) DEVICE — DRAPE 3/4 SHEET W REINFORCEMENT 56X77"

## (undated) DEVICE — TUBING SUCTION CONN 6FT STERILE

## (undated) DEVICE — BRUSH COLONOSCOPY CYTOLOGY

## (undated) DEVICE — SNARE POLYP SENS 27MM 240CM

## (undated) DEVICE — FORMALIN CUPS 10% BUFFERED

## (undated) DEVICE — DRSG DERMABOND 0.7ML

## (undated) DEVICE — GLV 7.5 PROTEXIS (WHITE)

## (undated) DEVICE — SUT MONOCRYL 4-0 27" PS-2 UNDYED

## (undated) DEVICE — FORCEP RADIAL JAW 4 JUMBO 2.8MM 3.2MM 240CM ORANGE DISP

## (undated) DEVICE — XI CORD MONOPOLAR CAUTERY (GREEN)

## (undated) DEVICE — BLADE SCALPEL SAFETY #11 WITH PLASTIC GREEN HANDLE

## (undated) DEVICE — PACK IV START WITH CHG

## (undated) DEVICE — RETRIEVER ROTH NET PLATINUM-UNIVERSAL

## (undated) DEVICE — SNARE LRG